# Patient Record
Sex: FEMALE | Race: WHITE | NOT HISPANIC OR LATINO | Employment: OTHER | URBAN - NONMETROPOLITAN AREA
[De-identification: names, ages, dates, MRNs, and addresses within clinical notes are randomized per-mention and may not be internally consistent; named-entity substitution may affect disease eponyms.]

---

## 2021-06-02 ENCOUNTER — OFFICE VISIT (OUTPATIENT)
Dept: FAMILY MEDICINE CLINIC | Facility: CLINIC | Age: 71
End: 2021-06-02

## 2021-06-02 VITALS
DIASTOLIC BLOOD PRESSURE: 84 MMHG | HEART RATE: 79 BPM | WEIGHT: 154 LBS | HEIGHT: 66 IN | BODY MASS INDEX: 24.75 KG/M2 | OXYGEN SATURATION: 96 % | TEMPERATURE: 98 F | SYSTOLIC BLOOD PRESSURE: 142 MMHG

## 2021-06-02 DIAGNOSIS — J44.1 CHRONIC OBSTRUCTIVE PULMONARY DISEASE WITH ACUTE EXACERBATION (HCC): Primary | ICD-10-CM

## 2021-06-02 DIAGNOSIS — Z85.118 HISTORY OF LUNG CANCER: ICD-10-CM

## 2021-06-02 DIAGNOSIS — Z12.11 COLON CANCER SCREENING: ICD-10-CM

## 2021-06-02 DIAGNOSIS — Z12.31 ENCOUNTER FOR SCREENING MAMMOGRAM FOR MALIGNANT NEOPLASM OF BREAST: ICD-10-CM

## 2021-06-02 DIAGNOSIS — Z78.0 POSTMENOPAUSE: ICD-10-CM

## 2021-06-02 DIAGNOSIS — I25.10 CORONARY ARTERIOSCLEROSIS: Chronic | ICD-10-CM

## 2021-06-02 DIAGNOSIS — E78.5 HYPERLIPIDEMIA, UNSPECIFIED HYPERLIPIDEMIA TYPE: Chronic | ICD-10-CM

## 2021-06-02 DIAGNOSIS — Z12.31 VISIT FOR SCREENING MAMMOGRAM: ICD-10-CM

## 2021-06-02 DIAGNOSIS — I25.2 HISTORY OF MI (MYOCARDIAL INFARCTION): ICD-10-CM

## 2021-06-02 PROBLEM — I21.3 STEMI (ST ELEVATION MYOCARDIAL INFARCTION): Status: ACTIVE | Noted: 2020-05-22

## 2021-06-02 PROBLEM — I21.19 ACUTE MYOCARDIAL INFARCTION OF INFERIOR WALL (HCC): Status: ACTIVE | Noted: 2018-11-05

## 2021-06-02 PROBLEM — Z87.891 EX-SMOKER: Status: ACTIVE | Noted: 2018-11-05

## 2021-06-02 PROBLEM — R07.9 CHEST PAIN: Status: ACTIVE | Noted: 2018-11-06

## 2021-06-02 PROBLEM — R06.00 DYSPNEA: Status: ACTIVE | Noted: 2018-11-05

## 2021-06-02 PROBLEM — G89.29 CHRONIC MIDLINE LOW BACK PAIN WITHOUT SCIATICA: Status: ACTIVE | Noted: 2019-07-22

## 2021-06-02 PROBLEM — M54.50 CHRONIC MIDLINE LOW BACK PAIN WITHOUT SCIATICA: Status: ACTIVE | Noted: 2019-07-22

## 2021-06-02 PROBLEM — Z95.5 STENTED CORONARY ARTERY: Status: ACTIVE | Noted: 2018-11-06

## 2021-06-02 PROCEDURE — 99204 OFFICE O/P NEW MOD 45 MIN: CPT | Performed by: NURSE PRACTITIONER

## 2021-06-02 PROCEDURE — 96372 THER/PROPH/DIAG INJ SC/IM: CPT | Performed by: NURSE PRACTITIONER

## 2021-06-02 RX ORDER — CARVEDILOL 6.25 MG/1
6.25 TABLET ORAL 2 TIMES DAILY WITH MEALS
COMMUNITY
Start: 2021-04-19

## 2021-06-02 RX ORDER — METHYLPREDNISOLONE ACETATE 80 MG/ML
60 INJECTION, SUSPENSION INTRA-ARTICULAR; INTRALESIONAL; INTRAMUSCULAR; SOFT TISSUE ONCE
Status: COMPLETED | OUTPATIENT
Start: 2021-06-02 | End: 2021-06-02

## 2021-06-02 RX ORDER — OXYCODONE AND ACETAMINOPHEN 7.5; 325 MG/1; MG/1
1 TABLET ORAL 3 TIMES DAILY PRN
COMMUNITY
Start: 2021-05-21

## 2021-06-02 RX ORDER — ATORVASTATIN CALCIUM 80 MG/1
80 TABLET, FILM COATED ORAL NIGHTLY
COMMUNITY
Start: 2021-04-19

## 2021-06-02 RX ORDER — NITROGLYCERIN 0.4 MG/1
TABLET SUBLINGUAL
COMMUNITY
End: 2022-05-23 | Stop reason: SDUPTHER

## 2021-06-02 RX ORDER — BRIMONIDINE TARTRATE 0.1 %
1 DROPS OPHTHALMIC (EYE) 2 TIMES DAILY
COMMUNITY
Start: 2021-03-30

## 2021-06-02 RX ORDER — ALBUTEROL SULFATE 90 UG/1
2 AEROSOL, METERED RESPIRATORY (INHALATION) EVERY 6 HOURS PRN
COMMUNITY
Start: 2021-04-20

## 2021-06-02 RX ORDER — ASPIRIN 81 MG/1
TABLET ORAL
COMMUNITY

## 2021-06-02 RX ORDER — TIOTROPIUM BROMIDE 18 UG/1
CAPSULE ORAL; RESPIRATORY (INHALATION)
COMMUNITY
Start: 2021-05-12

## 2021-06-02 RX ORDER — FUROSEMIDE 20 MG/1
20 TABLET ORAL DAILY
COMMUNITY
Start: 2021-04-19

## 2021-06-02 RX ORDER — METHYLPREDNISOLONE 4 MG/1
TABLET ORAL
Qty: 1 EACH | Refills: 0 | Status: SHIPPED | OUTPATIENT
Start: 2021-06-02 | End: 2021-07-07 | Stop reason: SDUPTHER

## 2021-06-02 RX ADMIN — METHYLPREDNISOLONE ACETATE 60 MG: 80 INJECTION, SUSPENSION INTRA-ARTICULAR; INTRALESIONAL; INTRAMUSCULAR; SOFT TISSUE at 16:03

## 2021-06-02 NOTE — PROGRESS NOTES
"Chief Complaint  Establish Care    Subjective          The patient presents today re-establish care after moving back to the area. She has a history of hyperlipidemia, coronary arteriosclerosis, myocardial infarction, COPD, lung nodules, chronic mid-low back pain, and a history of lung cancer. She also has a history of 2 stent implants in 2018. She has an appointment with her cardiologist 06/2021, after not seeing them for a year. She has not smoke cigarettes in 18 years. Would like however to establish care with local cardiologist since she has moved back to the [facility].     The patient reports that her chief complaint is difficulty breathing and dysphonia. She has been self treating at home with albuterol breathing treatments for temporary relief, but has run out of albuterol medication and refills. Her symptoms are aggravated by movement. She reports that since starting the breathing treatments she has begun coughing up a yellow substance. In addition, the patient complains of a rash across the bridge of her nose and bilateral cheeks. She denies pruritus and feels it is getting better, but is concerned it could be evidence of an allergic reaction and connected to her breathing difficulty. She denies wearing a CPAP machine at night. She also has avoided wearing a mask during the day for Covid-19 by staying home.     The patient admits to having taken 1 nitroglycerin pill near 03/2021 due to left arm pain which resolved afterward. She denies any nausea at the time. She would like a referral to a new cardiologist due to moving and wanting to go to one closer to her new location.      Angelica Call presents to [facility]  History of Present Illness    Objective   Vital Signs:   /84 (BP Location: Left arm, Patient Position: Sitting, Cuff Size: Adult)   Pulse 79   Temp 98 °F (36.7 °C) (Tympanic)   Ht 167.6 cm (66\")   Wt 69.9 kg (154 lb)   SpO2 96%   BMI 24.86 kg/m²   "   Physical Exam  Vitals and nursing note reviewed.   Constitutional:       General: She is not in acute distress.     Appearance: Normal appearance. She is not ill-appearing, toxic-appearing or diaphoretic.   HENT:      Head: Normocephalic and atraumatic.      Ears:      Comments: TMs not injected.     Mouth/Throat:      Pharynx: Posterior oropharyngeal erythema present.      Comments: Posterior pharynx mildly erythematous.  Cardiovascular:      Rate and Rhythm: Normal rate and regular rhythm.      Pulses: Normal pulses.      Heart sounds: Normal heart sounds. No murmur heard.   No friction rub. No gallop.    Pulmonary:      Effort: Respiratory distress present.      Breath sounds: No stridor. Wheezing present. No rhonchi or rales.      Comments: Lung fields are diminished throughout with scattered wheezes. Pulse ox on RA 96%  Musculoskeletal:      Right lower leg: No edema.      Left lower leg: No edema.   Skin:     General: Skin is warm and dry.      Findings: Erythema and rash present.      Comments: Across the bridge of her nose and cheeks, more so on L than R, she has a non itching, erythematous rash in individual lesions noted    Neurological:      Mental Status: She is alert and oriented to person, place, and time.   Psychiatric:         Mood and Affect: Mood normal.         Behavior: Behavior normal.         Thought Content: Thought content normal.         Judgment: Judgment normal.        Result Review :                   Assessment and Plan    Diagnoses and all orders for this visit:    1. Chronic obstructive pulmonary disease with acute exacerbation (CMS/HCC) (Primary)  -     Ambulatory Referral to Cardiology  -     Home Nebulizer  -     methylPREDNISolone acetate (DEPO-medrol) injection 60 mg  -     methylPREDNISolone (MEDROL) 4 MG dose pack; Take as directed on package instructions.  Dispense: 1 each; Refill: 0    2. Encounter for screening mammogram for malignant neoplasm of breast  -     Mammo  Screening Digital Tomosynthesis Bilateral With CAD; Future    3. Postmenopause  -     Ambulatory Referral to Cardiology  -     DEXA Bone Density Axial; Future    4. Coronary arteriosclerosis  -     Ambulatory Referral to Cardiology    5. Hyperlipidemia, unspecified hyperlipidemia type  -     Ambulatory Referral to Cardiology  -     Lipid panel; Future  -     CBC & Differential; Future  -     Comprehensive metabolic panel; Future  -     TSH; Future    6. History of lung cancer  -     Ambulatory Referral to Cardiology  -     Home Nebulizer    7. History of MI (myocardial infarction)  -     Ambulatory Referral to Cardiology    8. Visit for screening mammogram  -     Mammo Screening Digital Tomosynthesis Bilateral With CAD; Future    9. Colon cancer screening  -     Cologuard - Stool, Per Rectum; Future      I will obtain labs when she is fasting and she will be informed of the results via telephone. She will also be referred to a closer cardiologist at this locations so that she can follow up with him regarding her history of myocardial infarction and stemming coronary arteriosclerosis. She has already stopped smoking cigarettes. She was given a Depo 60 mg intramuscular injection in the office today, tolerated well. We will follow that with oral Medrol starting 06/03/2021. She is also given a new nebulizer machine and albuterol for her to use in the nebulizer once a day as needed. If symptoms should persist or worsen she will call back to let me know. Otherwise, she will as scheduled for chronic conditions. Mammogram and bone density tests have also been ordered for her. Health maintenance has been reviewed and updated. She has already received both of her Covid-19 injections. All questions and concerns are addressed with understanding noted. The patient is in agreement to above plan. Continue follow up with dr parry as scheduled.     I spent 33 minutes caring for Angelica on this date of service. This time includes  time spent by me in the following activities:preparing for the visit, performing a medically appropriate examination and/or evaluation , counseling and educating the patient/family/caregiver, ordering medications, tests, or procedures, referring and communicating with other health care professionals  and documenting information in the medical record  Follow Up   Return in about 6 months (around 12/2/2021), or if symptoms worsen or fail to improve, for Recheck, or sooner as needed.  Patient was given instructions and counseling regarding her condition or for health maintenance advice. Please see specific information pulled into the AVS if appropriate.     I TOAN Mckeon have personally performed the services described in this document as scribed by the above individual, and it is both accurate and complete.     TOAN Mckeon  6/3/2021  13:12 CDT

## 2021-06-03 RX ORDER — ALBUTEROL SULFATE 2.5 MG/3ML
2.5 SOLUTION RESPIRATORY (INHALATION) EVERY 4 HOURS PRN
Qty: 1 EACH | Refills: 11 | Status: SHIPPED | OUTPATIENT
Start: 2021-06-03

## 2021-06-03 RX ORDER — ALBUTEROL SULFATE 2.5 MG/3ML
2.5 SOLUTION RESPIRATORY (INHALATION) EVERY 4 HOURS PRN
Refills: 12 | Status: CANCELLED | OUTPATIENT
Start: 2021-06-03

## 2021-06-23 DIAGNOSIS — R07.9 CHEST PAIN, UNSPECIFIED TYPE: Primary | ICD-10-CM

## 2021-07-06 ENCOUNTER — LAB (OUTPATIENT)
Dept: LAB | Facility: OTHER | Age: 71
End: 2021-07-06

## 2021-07-06 DIAGNOSIS — E78.5 HYPERLIPIDEMIA, UNSPECIFIED HYPERLIPIDEMIA TYPE: ICD-10-CM

## 2021-07-06 LAB
ALBUMIN SERPL-MCNC: 4 G/DL (ref 3.5–5)
ALBUMIN/GLOB SERPL: 1.5 G/DL (ref 1.1–1.8)
ALP SERPL-CCNC: 68 U/L (ref 38–126)
ALT SERPL W P-5'-P-CCNC: 34 U/L
ANION GAP SERPL CALCULATED.3IONS-SCNC: 3 MMOL/L (ref 5–15)
AST SERPL-CCNC: 29 U/L (ref 14–36)
BASOPHILS # BLD AUTO: 0.05 10*3/MM3 (ref 0–0.2)
BASOPHILS NFR BLD AUTO: 0.9 % (ref 0–1.5)
BILIRUB SERPL-MCNC: 0.8 MG/DL (ref 0.2–1.3)
BUN SERPL-MCNC: 11 MG/DL (ref 7–23)
BUN/CREAT SERPL: 16.7 (ref 7–25)
CALCIUM SPEC-SCNC: 9.4 MG/DL (ref 8.4–10.2)
CHLORIDE SERPL-SCNC: 105 MMOL/L (ref 101–112)
CHOLEST SERPL-MCNC: 133 MG/DL (ref 150–200)
CO2 SERPL-SCNC: 31 MMOL/L (ref 22–30)
CREAT SERPL-MCNC: 0.66 MG/DL (ref 0.52–1.04)
DEPRECATED RDW RBC AUTO: 46.4 FL (ref 37–54)
EOSINOPHIL # BLD AUTO: 0 10*3/MM3 (ref 0–0.4)
EOSINOPHIL NFR BLD AUTO: 0 % (ref 0.3–6.2)
ERYTHROCYTE [DISTWIDTH] IN BLOOD BY AUTOMATED COUNT: 13.3 % (ref 12.3–15.4)
GFR SERPL CREATININE-BSD FRML MDRD: 89 ML/MIN/1.73 (ref 39–90)
GLOBULIN UR ELPH-MCNC: 2.7 GM/DL (ref 2.3–3.5)
GLUCOSE SERPL-MCNC: 98 MG/DL (ref 70–99)
HCT VFR BLD AUTO: 39 % (ref 34–46.6)
HDLC SERPL-MCNC: 58 MG/DL (ref 40–59)
HGB BLD-MCNC: 13.1 G/DL (ref 12–15.9)
LDLC SERPL CALC-MCNC: 61 MG/DL
LDLC/HDLC SERPL: 1.04 {RATIO} (ref 0–3.22)
LYMPHOCYTES # BLD AUTO: 2.46 10*3/MM3 (ref 0.7–3.1)
LYMPHOCYTES NFR BLD AUTO: 43.5 % (ref 19.6–45.3)
MCH RBC QN AUTO: 32.9 PG (ref 26.6–33)
MCHC RBC AUTO-ENTMCNC: 33.6 G/DL (ref 31.5–35.7)
MCV RBC AUTO: 98 FL (ref 79–97)
MONOCYTES # BLD AUTO: 0.53 10*3/MM3 (ref 0.1–0.9)
MONOCYTES NFR BLD AUTO: 9.4 % (ref 5–12)
NEUTROPHILS NFR BLD AUTO: 2.61 10*3/MM3 (ref 1.7–7)
NEUTROPHILS NFR BLD AUTO: 46.2 % (ref 42.7–76)
PLATELET # BLD AUTO: 240 10*3/MM3 (ref 140–450)
PMV BLD AUTO: 8.4 FL (ref 6–12)
POTASSIUM SERPL-SCNC: 4 MMOL/L (ref 3.4–5)
PROT SERPL-MCNC: 6.7 G/DL (ref 6.3–8.6)
RBC # BLD AUTO: 3.98 10*6/MM3 (ref 3.77–5.28)
SODIUM SERPL-SCNC: 139 MMOL/L (ref 137–145)
TRIGL SERPL-MCNC: 72 MG/DL
TSH SERPL DL<=0.05 MIU/L-ACNC: 1.43 UIU/ML (ref 0.27–4.2)
VLDLC SERPL-MCNC: 14 MG/DL (ref 5–40)
WBC # BLD AUTO: 5.65 10*3/MM3 (ref 3.4–10.8)

## 2021-07-06 PROCEDURE — 80061 LIPID PANEL: CPT | Performed by: NURSE PRACTITIONER

## 2021-07-06 PROCEDURE — 80053 COMPREHEN METABOLIC PANEL: CPT | Performed by: NURSE PRACTITIONER

## 2021-07-06 PROCEDURE — 84443 ASSAY THYROID STIM HORMONE: CPT | Performed by: NURSE PRACTITIONER

## 2021-07-06 PROCEDURE — 85025 COMPLETE CBC W/AUTO DIFF WBC: CPT | Performed by: NURSE PRACTITIONER

## 2021-07-06 PROCEDURE — 36415 COLL VENOUS BLD VENIPUNCTURE: CPT | Performed by: NURSE PRACTITIONER

## 2021-07-07 DIAGNOSIS — J44.1 CHRONIC OBSTRUCTIVE PULMONARY DISEASE WITH ACUTE EXACERBATION (HCC): ICD-10-CM

## 2021-07-07 RX ORDER — METHYLPREDNISOLONE 4 MG/1
TABLET ORAL
Qty: 1 EACH | Refills: 0 | Status: ON HOLD | OUTPATIENT
Start: 2021-07-07 | End: 2021-07-19

## 2021-07-14 ENCOUNTER — HOSPITAL ENCOUNTER (OUTPATIENT)
Dept: CARDIOLOGY | Facility: HOSPITAL | Age: 71
Discharge: HOME | End: 2021-07-14

## 2021-07-14 ENCOUNTER — HOSPITAL ENCOUNTER (OUTPATIENT)
Dept: NUCLEAR MEDICINE | Facility: HOSPITAL | Age: 71
Discharge: HOME | End: 2021-07-14

## 2021-07-14 DIAGNOSIS — R07.9 CHEST PAIN, UNSPECIFIED TYPE: ICD-10-CM

## 2021-07-14 DIAGNOSIS — I25.10 ATHEROSCLEROSIS OF NATIVE CORONARY ARTERY OF NATIVE HEART, ANGINA PRESENCE UNSPECIFIED: ICD-10-CM

## 2021-07-14 DIAGNOSIS — Z98.61 HISTORY OF PTCA: ICD-10-CM

## 2021-07-14 PROCEDURE — 93017 CV STRESS TEST TRACING ONLY: CPT

## 2021-07-14 PROCEDURE — 0 TECHNETIUM SESTAMIBI: Performed by: INTERNAL MEDICINE

## 2021-07-14 PROCEDURE — 78452 HT MUSCLE IMAGE SPECT MULT: CPT

## 2021-07-14 PROCEDURE — 25010000002 REGADENOSON 0.4 MG/5ML SOLUTION: Performed by: INTERNAL MEDICINE

## 2021-07-14 PROCEDURE — A9500 TC99M SESTAMIBI: HCPCS | Performed by: INTERNAL MEDICINE

## 2021-07-14 RX ADMIN — TECHNETIUM TC 99M SESTAMIBI 1 DOSE: 1 INJECTION INTRAVENOUS at 09:38

## 2021-07-14 RX ADMIN — TECHNETIUM TC 99M SESTAMIBI 1 DOSE: 1 INJECTION INTRAVENOUS at 11:00

## 2021-07-14 RX ADMIN — REGADENOSON 0.4 MG: 0.08 INJECTION, SOLUTION INTRAVENOUS at 11:00

## 2021-07-14 NOTE — H&P
Cardiology History and Physical Note        Patient Name: Angelica Call  Age/Sex: 71 y.o. female  : 1950  MRN: [id number]    Date of Admission : 2021    Primary care Physician: Marylu Torre APRN    Reason for Admission: Chest pain history of atherosclerotic coronary artery disease Lexiscan Cardiolite stress test      Subjective:       Chief Complaint: Chest pain left arm pain    History of Present Illness:  Angelica Call is a 71 y.o. female     Height of 5 feet 6 inches weight 254 pounds with a body mask index of 24 with a past medical history significant for atherosclerotic coronary artery disease s/p aborted myocardial infarction in 2018 with PTCA and stenting of the circumflex artery with deployment of a 3 mm x 15 mm and 2.5 mm x 8 mm Xience Luma drug-eluting stent in the circumflex artery with repeat coronary angiogram done in 2019, history of arterial hypertension, hypertensive heart disease, hyperlipidemia, right lung lobectomy, chronic back pain, chronic obstructive lung disease, history of glaucoma, and previous history of tobacco abuse.    Patient has been followed by [facility] in [place].    Patient was evaluated by the primary care physician and was recommended cardiac evaluation.  Patient complains of having left arm discomfort.  Patient had taken nitroglycerin.  Patient complains of having symptoms of left arm discomfort prior to the myocardial infarction and prior to the stent placement.    Patient on questioning complains of having some atypical symptoms of chest pain.  Patient complains of having bilateral lower extremity edema.  Patient does eat a lot of salt.  Patient on further questioning denies any symptoms of palpitation.  Patient denies any symptoms of lightheaded dizziness near syncope.  Patient denies any nausea vomiting.  Patient denies any fever chill productive cough.  Patient was last evaluated by the cardiologist about 2 years ago.    Patient has  not had any recent cardiac evaluation.  Patient resting electrocardiogram done reveals sinus bradycardia at the rate of 59 bpm with nonspecific ST-T wave changes.  Patient after the stent placement had taken Plavix of 3 months duration.    Blood pressure today is 135/80 pulse of 75 respiration of 18 oxygen saturation of 100%.    Patient 10 point review of system except for what stated in the history of present illness and negative.      Concurrent  Medical History:  1.  Bilateral arm pain with atypical chest pain with sinus bradycardia.  2.  Atherosclerotic coronary artery disease.  3.  Aborted myocardial infarction in October 13, 2018 in [place].  4.  PTCA and stenting of the circumflex artery with deployment of four 3 mm x 15 mm and a 2.5 mm x 8 mm Xience Luma drug-eluting stent in the circumflex artery.  5.  Nonobstructive disease in the right coronary artery and left anterior descending artery.  6.  Arterial hypertension.  7.  Hypertensive heart disease with nonrheumatic mild mitral and nonrheumatic mild tricuspid regurgitation.  8.  Hyperlipidemia.  9.  Chronic obstructive lung disease.  10.  Chronic back pain.  11.  Allergic rhinitis.  12.  History of glaucoma.  13.  History of right lung lobectomy.      Past Surgical History:  1.  Coronary angiogram and PTCA and stenting.  2.  Right upper lung lobectomy done in 2005.      Family History: No history of premature atherosclerotic coronary artery disease      Social History: Quit smoking 18 years ago denies any alcohol intake.       Cardiac Risk factor:   1.  Postmenopausal.  2.  Arterial hypertension.  3.  Hyperlipidemia.  4.  Previous history of tobacco abuse    Allergies:  Allergies   Allergen Reactions   • Bee Pollen Anaphylaxis       Home Medication::  Prior to Admission medications    Medication Sig Start Date End Date Taking? Authorizing Provider   albuterol (PROVENTIL) (2.5 MG/3ML) 0.083% nebulizer solution Take 2.5 mg by nebulization Every 4 (Four)  Hours As Needed for Wheezing. 6/3/21   Marylu Torre APRN   albuterol sulfate  (90 Base) MCG/ACT inhaler Inhale 2 puffs Every 6 (Six) Hours As Needed. 4/20/21   Caty Barnard MD   Alphagan P 0.1 % solution ophthalmic solution Administer 1 drop to both eyes 2 (Two) Times a Day. 3/30/21   Caty Barnard MD   aspirin (Aspir-Low) 81 MG EC tablet Aspir-Low 81 mg tablet,delayed release    Caty Barnard MD   atorvastatin (LIPITOR) 80 MG tablet  4/19/21   Caty Barnard MD   carvedilol (COREG) 6.25 MG tablet  4/19/21   Caty Barnard MD   furosemide (LASIX) 20 MG tablet  4/19/21   Caty Barnard MD   methylPREDNISolone (MEDROL) 4 MG dose pack Take as directed on package instructions. 7/7/21   Marylu Torre APRN   nitroglycerin (NITROSTAT) 0.4 MG SL tablet nitroglycerin 0.4 mg sublingual tablet    Caty Barnard MD   oxyCODONE-acetaminophen (PERCOCET) 7.5-325 MG per tablet Take 1 tablet by mouth 3 (Three) Times a Day As Needed. for pain 5/21/21   Caty Barnard MD   Spiriva HandiHaler 18 MCG per inhalation capsule INHALE CONTENTS OF 1 CAPSULE ONCE DAILY USING HANDIHALER 5/12/21   Caty Barnard MD Wixela Inhub 250-50 MCG/DOSE DISKUS Inhale 1 puff 2 (Two) Times a Day. 5/11/21   Caty Barnard MD         Review of Systems   Constitutional: Positive for fatigue. Negative for chills, fever and unexpected weight change.   HENT: Negative for hearing loss and nosebleeds.    Eyes: Negative for visual disturbance.   Respiratory: Positive for chest tightness. Negative for cough, shortness of breath and wheezing.    Cardiovascular: Positive for leg swelling. Negative for chest pain and palpitations.   Gastrointestinal: Negative for abdominal pain, blood in stool, constipation, diarrhea, nausea and vomiting.   Endocrine: Negative for cold intolerance, heat intolerance, polydipsia, polyphagia and polyuria.   Genitourinary: Negative for hematuria.    Musculoskeletal: Positive for arthralgias and back pain. Negative for joint swelling, myalgias and neck pain.   Skin: Negative for color change, rash and wound.   Neurological: Negative for dizziness, seizures, syncope, light-headedness, numbness and headaches.   Hematological: Does not bruise/bleed easily.         Objective:     There were no vitals taken for this visit.  Blood pressure today is 135/80 pulse of 75 respiration of 18 oxygen saturation of 100%.      Constitutional:       Appearance: Well-developed.   Eyes:      General: No scleral icterus.     Conjunctiva/sclera: Conjunctivae normal.      Pupils: Pupils are equal, round, and reactive to light.   HENT:      Head: Normocephalic and atraumatic.      Left Ear: External ear normal.      Nose: Nose normal.   Neck:      Thyroid: No thyromegaly.      Vascular: No JVD.      Trachea: No tracheal deviation.      Lymphadenopathy: No cervical adenopathy.   Pulmonary:      Breath sounds: Normal breath sounds. No stridor.   Cardiovascular:      Normal rate. Regular rhythm.   Abdominal:      General: Bowel sounds are normal.   Musculoskeletal: Normal range of motion.      Cervical back: Normal range of motion and neck supple. Skin:     General: Skin is warm and dry.   Neurological:      Mental Status: Alert and oriented to person, place, and time.      Deep Tendon Reflexes: Reflexes are normal and symmetric.   Psychiatric:         Behavior: Behavior normal.         Thought Content: Thought content normal.         Judgment: Judgment normal.         Lab Review:           Invalid input(s): LABALBU, PROT                                    EKG:   ECG/EMG Results (last 24 hours)     ** No results found for the last 24 hours. **          Imaging:  Imaging Results (Last 24 Hours)     ** No results found for the last 24 hours. **          I personally viewed and interpreted the patient's EKG/Telemetry data.    Assessment:   1.  Atypical chest discomfort.  2.  Bilateral arm  pain.  3.  Atherosclerotic coronary artery disease.  4.  Previous PTCA and stenting of the circumflex artery.  5.  Arterial hypertension.  6.  Hypertensive heart disease.  7.  Hyperlipidemia.  8.  Chronic obstructive lung disease with a right lung lobectomy.          Plan:   1.  Bilateral arm pain with chest pain with history of atherosclerotic coronary artery disease.  Patient had aborted myocardial infarction with PTCA and stenting of the circumflex artery done in October 2018.  Patient last cardiac evaluation was more than 2 years ago.  Patient is currently not on Plavix.  Patient resting electrocardiogram done reveals sinus bradycardia.  Patient has been experiencing similar discomfort of chest tightness and bilateral arm pain.  Due to the patient's symptom complex with previous aborted myocardial infarction in 2018 patient at the present time has been recommended a two-dimensional echocardiogram to evaluate the left ventricular systolic function and to rule out regional segmental wall motion abnormality and a myocardial perfusion Lexiscan Cardiolite stress test.  Risk-benefit treatment option for the myocardial perfusion Lexiscan Cardiolite stress test were discussed with the patient and an informed consent was obtained.  Plan was to proceed with a myocardial perfusion Lexiscan Cardiolite stress test on 7/14/2021.    2.  Arterial hypertension.  Patient blood pressures currently well controlled.  Patient would be continued on the present dose of the carvedilol 6.25 mg twice a day.  Patient has been counseled to decrease her salt intake.    3.  Hypertensive heart disease.  Clinically at the present time patient is is euvolemic.  Patient would be continued on the present dose of the Lasix 20 mg once a day.  Patient would undergo transthoracic echocardiogram.  Patient would be started on angiotensin receptor blocker or an ACE inhibitor for afterload reduction if the patient does have left ventricular systolic  dysfunction or valvular insufficiency.  Patient has previous history of nonrheumatic mild mitral and nonrheumatic mild tricuspid regurgitation.  Patient is to undergo a transthoracic echocardiogram.    4.  Hyperlipidemia.  Patient has been counseled on low-fat low-cholesterol diet and to continue the present dose of the Lipitor 80 mg at bedtime.  Patient is to undergo lipid profile including liver function test evaluation.    5.  Chronic obstructive lung disease with right lung lobectomy patient has been followed by the pulmonary Dr. Branch in [place].    6.  History of glaucoma as noted.    7.  Chronic back pain is noted.  Patient is currently on oxycodone and has been followed by the primary care physician.    The above plan of management were discussed with the patient      Time: time spent in face-to-face evaluation of greater than 55 minutes and interacting and formulating examining and discussing the plan with the patient with 50% of greater time spent in face-to-face interaction.    Electronically signed by Sha Giraldo MD, 07/14/21, 6:36 AM CDT.    Dictated utilizing Dragon dictation.

## 2021-07-16 ENCOUNTER — PREP FOR SURGERY (OUTPATIENT)
Dept: OTHER | Facility: HOSPITAL | Age: 71
End: 2021-07-16

## 2021-07-16 DIAGNOSIS — R93.1 ABNORMAL NUCLEAR CARDIAC IMAGING TEST: ICD-10-CM

## 2021-07-16 DIAGNOSIS — R07.9 CHEST PAIN, UNSPECIFIED TYPE: Primary | ICD-10-CM

## 2021-07-16 RX ORDER — SODIUM CHLORIDE 0.9 % (FLUSH) 0.9 %
10 SYRINGE (ML) INJECTION AS NEEDED
Status: CANCELLED | OUTPATIENT
Start: 2021-07-19

## 2021-07-16 RX ORDER — SODIUM CHLORIDE 0.9 % (FLUSH) 0.9 %
3 SYRINGE (ML) INJECTION EVERY 12 HOURS SCHEDULED
Status: CANCELLED | OUTPATIENT
Start: 2021-07-19

## 2021-07-16 RX ORDER — SODIUM CHLORIDE 9 MG/ML
75 INJECTION, SOLUTION INTRAVENOUS CONTINUOUS
Status: CANCELLED | OUTPATIENT
Start: 2021-07-19

## 2021-07-19 ENCOUNTER — HOSPITAL ENCOUNTER (OUTPATIENT)
Facility: HOSPITAL | Age: 71
Setting detail: HOSPITAL OUTPATIENT SURGERY
Discharge: HOME | End: 2021-07-19
Attending: INTERNAL MEDICINE | Admitting: INTERNAL MEDICINE

## 2021-07-19 VITALS
HEART RATE: 63 BPM | BODY MASS INDEX: 24.62 KG/M2 | DIASTOLIC BLOOD PRESSURE: 65 MMHG | TEMPERATURE: 98.4 F | OXYGEN SATURATION: 99 % | RESPIRATION RATE: 16 BRPM | WEIGHT: 153.22 LBS | SYSTOLIC BLOOD PRESSURE: 132 MMHG | HEIGHT: 66 IN

## 2021-07-19 DIAGNOSIS — R93.1 ABNORMAL NUCLEAR CARDIAC IMAGING TEST: ICD-10-CM

## 2021-07-19 DIAGNOSIS — R07.9 CHEST PAIN, UNSPECIFIED TYPE: ICD-10-CM

## 2021-07-19 LAB
ANION GAP SERPL CALCULATED.3IONS-SCNC: 7 MMOL/L (ref 5–15)
BASOPHILS # BLD AUTO: 0.04 10*3/MM3 (ref 0–0.2)
BASOPHILS NFR BLD AUTO: 0.6 % (ref 0–1.5)
BUN SERPL-MCNC: 12 MG/DL (ref 8–23)
BUN/CREAT SERPL: 18.5 (ref 7–25)
CALCIUM SPEC-SCNC: 8.8 MG/DL (ref 8.6–10.5)
CHLORIDE SERPL-SCNC: 98 MMOL/L (ref 98–107)
CO2 SERPL-SCNC: 26 MMOL/L (ref 22–29)
CREAT SERPL-MCNC: 0.65 MG/DL (ref 0.57–1)
DEPRECATED RDW RBC AUTO: 46.9 FL (ref 37–54)
EOSINOPHIL # BLD AUTO: 0 10*3/MM3 (ref 0–0.4)
EOSINOPHIL NFR BLD AUTO: 0 % (ref 0.3–6.2)
ERYTHROCYTE [DISTWIDTH] IN BLOOD BY AUTOMATED COUNT: 13.2 % (ref 12.3–15.4)
GFR SERPL CREATININE-BSD FRML MDRD: 90 ML/MIN/1.73
GLUCOSE SERPL-MCNC: 108 MG/DL (ref 65–99)
HCT VFR BLD AUTO: 39.8 % (ref 34–46.6)
HGB BLD-MCNC: 13.4 G/DL (ref 12–15.9)
IMM GRANULOCYTES # BLD AUTO: 0.03 10*3/MM3 (ref 0–0.05)
IMM GRANULOCYTES NFR BLD AUTO: 0.4 % (ref 0–0.5)
INR PPP: 0.91 (ref 0.8–1.2)
LYMPHOCYTES # BLD AUTO: 2.67 10*3/MM3 (ref 0.7–3.1)
LYMPHOCYTES NFR BLD AUTO: 37.4 % (ref 19.6–45.3)
MCH RBC QN AUTO: 32.1 PG (ref 26.6–33)
MCHC RBC AUTO-ENTMCNC: 33.7 G/DL (ref 31.5–35.7)
MCV RBC AUTO: 95.4 FL (ref 79–97)
MONOCYTES # BLD AUTO: 0.62 10*3/MM3 (ref 0.1–0.9)
MONOCYTES NFR BLD AUTO: 8.7 % (ref 5–12)
NEUTROPHILS NFR BLD AUTO: 3.77 10*3/MM3 (ref 1.7–7)
NEUTROPHILS NFR BLD AUTO: 52.9 % (ref 42.7–76)
NRBC BLD AUTO-RTO: 0 /100 WBC (ref 0–0.2)
PLATELET # BLD AUTO: 227 10*3/MM3 (ref 140–450)
PMV BLD AUTO: 8.6 FL (ref 6–12)
POTASSIUM SERPL-SCNC: 3.8 MMOL/L (ref 3.5–5.2)
PROTHROMBIN TIME: 12.2 SECONDS (ref 11.1–15.3)
RBC # BLD AUTO: 4.17 10*6/MM3 (ref 3.77–5.28)
SODIUM SERPL-SCNC: 131 MMOL/L (ref 136–145)
WBC # BLD AUTO: 7.13 10*3/MM3 (ref 3.4–10.8)

## 2021-07-19 PROCEDURE — C1894 INTRO/SHEATH, NON-LASER: HCPCS | Performed by: INTERNAL MEDICINE

## 2021-07-19 PROCEDURE — C1760 CLOSURE DEV, VASC: HCPCS | Performed by: INTERNAL MEDICINE

## 2021-07-19 PROCEDURE — 25010000002 HEPARIN (PORCINE) PER 1000 UNITS: Performed by: INTERNAL MEDICINE

## 2021-07-19 PROCEDURE — C1769 GUIDE WIRE: HCPCS | Performed by: INTERNAL MEDICINE

## 2021-07-19 PROCEDURE — 85610 PROTHROMBIN TIME: CPT | Performed by: INTERNAL MEDICINE

## 2021-07-19 PROCEDURE — 0 IOPAMIDOL PER 1 ML: Performed by: INTERNAL MEDICINE

## 2021-07-19 PROCEDURE — 80048 BASIC METABOLIC PNL TOTAL CA: CPT | Performed by: INTERNAL MEDICINE

## 2021-07-19 PROCEDURE — 85025 COMPLETE CBC W/AUTO DIFF WBC: CPT | Performed by: INTERNAL MEDICINE

## 2021-07-19 PROCEDURE — 25010000002 FENTANYL CITRATE (PF) 50 MCG/ML SOLUTION: Performed by: INTERNAL MEDICINE

## 2021-07-19 PROCEDURE — 93458 L HRT ARTERY/VENTRICLE ANGIO: CPT | Performed by: INTERNAL MEDICINE

## 2021-07-19 PROCEDURE — 25010000002 MIDAZOLAM PER 1 MG: Performed by: INTERNAL MEDICINE

## 2021-07-19 RX ORDER — LIDOCAINE HYDROCHLORIDE 20 MG/ML
INJECTION, SOLUTION INFILTRATION; PERINEURAL AS NEEDED
Status: DISCONTINUED | OUTPATIENT
Start: 2021-07-19 | End: 2021-07-19 | Stop reason: HOSPADM

## 2021-07-19 RX ORDER — ACETAMINOPHEN 325 MG/1
650 TABLET ORAL EVERY 4 HOURS PRN
Status: DISCONTINUED | OUTPATIENT
Start: 2021-07-19 | End: 2021-07-19 | Stop reason: HOSPADM

## 2021-07-19 RX ORDER — SODIUM CHLORIDE 9 MG/ML
100 INJECTION, SOLUTION INTRAVENOUS CONTINUOUS
Status: DISCONTINUED | OUTPATIENT
Start: 2021-07-19 | End: 2021-07-19 | Stop reason: HOSPADM

## 2021-07-19 RX ORDER — CLOPIDOGREL BISULFATE 75 MG/1
TABLET ORAL AS NEEDED
Status: DISCONTINUED | OUTPATIENT
Start: 2021-07-19 | End: 2021-07-19 | Stop reason: HOSPADM

## 2021-07-19 RX ORDER — FENTANYL CITRATE 50 UG/ML
INJECTION, SOLUTION INTRAMUSCULAR; INTRAVENOUS AS NEEDED
Status: DISCONTINUED | OUTPATIENT
Start: 2021-07-19 | End: 2021-07-19 | Stop reason: HOSPADM

## 2021-07-19 RX ORDER — MIDAZOLAM HYDROCHLORIDE 1 MG/ML
INJECTION INTRAMUSCULAR; INTRAVENOUS AS NEEDED
Status: DISCONTINUED | OUTPATIENT
Start: 2021-07-19 | End: 2021-07-19 | Stop reason: HOSPADM

## 2021-07-19 RX ORDER — CLOPIDOGREL BISULFATE 75 MG/1
75 TABLET ORAL DAILY
Qty: 30 TABLET | Refills: 11 | Status: SHIPPED | OUTPATIENT
Start: 2021-07-19

## 2021-07-19 RX ORDER — SODIUM CHLORIDE 0.9 % (FLUSH) 0.9 %
3 SYRINGE (ML) INJECTION EVERY 12 HOURS SCHEDULED
Status: DISCONTINUED | OUTPATIENT
Start: 2021-07-19 | End: 2021-07-19 | Stop reason: HOSPADM

## 2021-07-19 RX ORDER — SODIUM CHLORIDE 9 MG/ML
75 INJECTION, SOLUTION INTRAVENOUS CONTINUOUS
Status: DISCONTINUED | OUTPATIENT
Start: 2021-07-19 | End: 2021-07-19 | Stop reason: HOSPADM

## 2021-07-19 RX ORDER — SODIUM CHLORIDE 0.9 % (FLUSH) 0.9 %
10 SYRINGE (ML) INJECTION AS NEEDED
Status: DISCONTINUED | OUTPATIENT
Start: 2021-07-19 | End: 2021-07-19 | Stop reason: HOSPADM

## 2021-07-19 RX ADMIN — SODIUM CHLORIDE 75 ML/HR: 9 INJECTION, SOLUTION INTRAVENOUS at 06:29

## 2021-07-19 NOTE — H&P
Cardiology History and Physical Note        Patient Name: Angelica Call  Age/Sex: 71 y.o. female  : 1950  MRN: [id number]    Date of Admission : 2021    Primary care Physician: Tushar Witt DO    Reason for Admission: Chest pain positive myocardial perfusion scan left heart catheterization.      Subjective:       Chief Complaint: Chest pain    History of Present Illness:  Angelica Call is a 71 y.o. female     Body mass index is 24.73 kg/m².  Height of 5 feet 6 inches weight 254 pounds with a body mask index of 24 with a past medical history significant for atherosclerotic coronary artery disease s/p aborted myocardial infarction in 2018 with PTCA and stenting of the circumflex artery with deployment of a 3 mm x 15 mm and 2.5 mm x 8 mm Xience Luma drug-eluting stent in the circumflex artery with repeat coronary angiogram done in 2019, history of arterial hypertension, hypertensive heart disease, hyperlipidemia, right lung lobectomy, chronic back pain, chronic obstructive lung disease, history of glaucoma, and previous history of tobacco abuse.     Patient has been followed by [facility] in [place].     Patient was evaluated by the primary care physician and was recommended cardiac evaluation.  Patient complains of having left arm discomfort.  Patient had taken nitroglycerin.  Patient complains of having symptoms of left arm discomfort prior to the myocardial infarction and prior to the stent placement.     Patient on questioning complains of having some atypical symptoms of chest pain.  Patient complains of having bilateral lower extremity edema.  Patient does eat a lot of salt.  Patient on further questioning denies any symptoms of palpitation.  Patient denies any symptoms of lightheaded dizziness near syncope.  Patient denies any nausea vomiting.  Patient denies any fever chill productive cough.         Patient resting electrocardiogram done reveals sinus bradycardia  at the rate of 59 bpm with nonspecific ST-T wave changes.  Patient after the stent placement had taken Plavix of 3 months duration.    Patient underwent a myocardial perfusion scan.  Patient myocardial perfusion scan was suggestive of reversible ischemia in the inferior wall distribution and a high risk study.  Due to the patient positive myocardial perfusion scan with reversible ischemia in the inferior wall distribution with previous PTCA and stenting of the circumflex artery patient was recommended coronary angiogram.  Risk-benefit treatment option for the coronary angiogram were discussed with the patient and an informed consent was obtained.  Patient Mallampati score #2 patient ASA classification 1 #2.  Risk for minimal sedation was also discussed with the patient.     Blood pressure today is 135/80 pulse of 75 respiration of 18 oxygen saturation of 100%.     Patient 10 point review of system except for what stated in the history of present illness and negative.        Concurrent  Medical History:  1.  Positive myocardial perfusion scan for reversible ischemia in the inferior wall distribution and a high risk study.  2.  Atherosclerotic coronary artery disease.  3.  Aborted myocardial infarction in October 13, 2018 in [place].  4.  PTCA and stenting of the circumflex artery with deployment of four 3 mm x 15 mm and a 2.5 mm x 8 mm Xience Luma drug-eluting stent in the circumflex artery.  5.  Nonobstructive disease in the right coronary artery and left anterior descending artery.  6.  Arterial hypertension.  7.  Hypertensive heart disease with nonrheumatic mild mitral and nonrheumatic mild tricuspid regurgitation.  8.  Hyperlipidemia.  9.  Chronic obstructive lung disease.  10.  Chronic back pain.  11.  Allergic rhinitis.  12.  History of glaucoma.  13.  History of right lung lobectomy.        Past Surgical History:  1.  Coronary angiogram and PTCA and stenting.  2.  Right upper lung lobectomy done in  2005.        Family History: No history of premature atherosclerotic coronary artery disease        Social History: Quit smoking 18 years ago denies any alcohol intake.        Cardiac Risk factor:   1.  Postmenopausal.  2.  Arterial hypertension.  3.  Hyperlipidemia.  4.  Previous history of tobacco abuse    Allergies:  Allergies   Allergen Reactions   • Bee Pollen Anaphylaxis       Home Medication::  Prior to Admission medications    Medication Sig Start Date End Date Taking? Authorizing Provider   albuterol (PROVENTIL) (2.5 MG/3ML) 0.083% nebulizer solution Take 2.5 mg by nebulization Every 4 (Four) Hours As Needed for Wheezing. 6/3/21  Yes Marylu Torre APRN   albuterol sulfate  (90 Base) MCG/ACT inhaler Inhale 2 puffs Every 6 (Six) Hours As Needed. 4/20/21  Yes Caty Barnard MD   Alphagan P 0.1 % solution ophthalmic solution Administer 1 drop to both eyes 2 (Two) Times a Day. 3/30/21  Yes Caty Barnard MD   aspirin (Aspir-Low) 81 MG EC tablet Aspir-Low 81 mg tablet,delayed release   Yes ProviderCaty MD   carvedilol (COREG) 6.25 MG tablet Take 6.25 mg by mouth 2 (Two) Times a Day With Meals. 4/19/21  Yes Caty Barnard MD   furosemide (LASIX) 20 MG tablet Take 20 mg by mouth Daily. 4/19/21  Yes Caty Barnard MD   oxyCODONE-acetaminophen (PERCOCET) 7.5-325 MG per tablet Take 1 tablet by mouth 3 (Three) Times a Day As Needed. for pain 5/21/21  Yes Caty Barnard MD   Spiriva HandiHaler 18 MCG per inhalation capsule INHALE CONTENTS OF 1 CAPSULE ONCE DAILY USING HANDIHALER 5/12/21  Yes Caty Barnard MD Wixela Inhub 250-50 MCG/DOSE DISKUS Inhale 1 puff 2 (Two) Times a Day. 5/11/21  Yes Caty Barnard MD   atorvastatin (LIPITOR) 80 MG tablet Take 80 mg by mouth Every Night. 4/19/21   Caty Barnard MD   nitroglycerin (NITROSTAT) 0.4 MG SL tablet nitroglycerin 0.4 mg sublingual tablet    Caty Barnard MD   methylPREDNISolone (MEDROL)  "4 MG dose pack Take as directed on package instructions. 7/7/21 7/19/21  Marylu Torre, APRN         Review of Systems   Constitutional: Negative for chills, fever and unexpected weight change.   HENT: Negative for hearing loss and nosebleeds.    Eyes: Negative for visual disturbance.   Respiratory: Positive for chest tightness and shortness of breath. Negative for cough and wheezing.    Cardiovascular: Positive for chest pain. Negative for palpitations and leg swelling.   Gastrointestinal: Negative for abdominal pain, blood in stool, constipation, diarrhea, nausea and vomiting.   Endocrine: Negative for cold intolerance, heat intolerance, polydipsia, polyphagia and polyuria.   Genitourinary: Negative for hematuria.   Musculoskeletal: Negative for joint swelling, myalgias and neck pain.   Skin: Negative for color change, rash and wound.   Neurological: Negative for dizziness, seizures, syncope, light-headedness, numbness and headaches.   Hematological: Does not bruise/bleed easily.         Objective:     BP (!) 182/82 (BP Location: Left arm, Patient Position: Lying)   Pulse 70   Temp 97.1 °F (36.2 °C) (Temporal)   Resp 18   Ht 167.6 cm (66\")   Wt 69.5 kg (153 lb 3.5 oz)   SpO2 95%   BMI 24.73 kg/m²     Constitutional:       Appearance: Well-developed.   Eyes:      General: No scleral icterus.     Conjunctiva/sclera: Conjunctivae normal.      Pupils: Pupils are equal, round, and reactive to light.   HENT:      Head: Normocephalic and atraumatic.      Left Ear: External ear normal.      Nose: Nose normal.   Neck:      Thyroid: No thyromegaly.      Vascular: No JVD.      Trachea: No tracheal deviation.      Lymphadenopathy: No cervical adenopathy.   Pulmonary:      Breath sounds: Normal breath sounds. No stridor.   Cardiovascular:      Normal rate. Regular rhythm.   Abdominal:      General: Bowel sounds are normal.   Musculoskeletal: Normal range of motion.      Cervical back: Normal range of motion and neck " supple. Skin:     General: Skin is warm and dry.   Neurological:      Mental Status: Alert and oriented to person, place, and time.      Deep Tendon Reflexes: Reflexes are normal and symmetric.   Psychiatric:         Behavior: Behavior normal.         Thought Content: Thought content normal.         Judgment: Judgment normal.         Lab Review:     Results from last 7 days   Lab Units 07/19/21  0617   SODIUM mmol/L 131*   POTASSIUM mmol/L 3.8   CHLORIDE mmol/L 98   CO2 mmol/L 26.0   BUN mg/dL 12   CREATININE mg/dL 0.65   CALCIUM mg/dL 8.8   GLUCOSE mg/dL 108*             Results from last 7 days   Lab Units 07/19/21  0617   WBC 10*3/mm3 7.13   HEMOGLOBIN g/dL 13.4   HEMATOCRIT % 39.8   PLATELETS 10*3/mm3 227     Results from last 7 days   Lab Units 07/19/21  0617   INR  0.91                       EKG:   ECG/EMG Results (last 24 hours)     ** No results found for the last 24 hours. **          Imaging:  Imaging Results (Last 24 Hours)     ** No results found for the last 24 hours. **          I personally viewed and interpreted the patient's EKG/Telemetry data.    Assessment:   1.  Chest pain.  2.  Atherosclerotic coronary artery disease.  3.  Previous PTCA and stenting.  4.  Arterial hypertension.  5.  Hypertensive heart disease.  6.  Hyperlipidemia.  7.  Chronic obstructive lung disease        Plan:   1.  Chest pain.  Patient does have history of documented atherosclerotic coronary artery disease with previous PTCA and stenting.  Patient has symptoms of substernal chest pain which was atypical.  Patient underwent a myocardial perfusion scan.  Patient myocardial perfusion scan was suggestive of reversible ischemia in the inferior wall distribution and a high risk study.  Patient was recommended coronary angiogram.  Risk-benefit treatment option for the coronary angiogram were discussed with the patient and an informed consent was obtained.  Patient Mallampati score #2 patient ASA classification 1 #2.  Risk for  minimal sedation was also discussed with the patient.  Plan was to proceed with a coronary angiogram.    2.  Arterial hypertension.  Patient blood pressure is currently well controlled.  Patient will be continued on the present dose of the carvedilol.    3.  Hypertensive heart disease with nonrheumatic mitral and tricuspid regurgitation.  Clinically at the present time patient is not in congestive heart failure.    4.  Hyperlipidemia.  Patient has been counseled on low-fat low-cholesterol diet and to continue the present dose of the Lipitor.        5.  Chronic obstructive lung disease with right lung lobectomy patient has been followed by the pulmonary Dr. Branch in [place].     6.  History of glaucoma as noted.     7.  Chronic back pain is noted.  Patient is currently on oxycodone and has been followed by the primary care physician.     The above plan of management were discussed with the patient        Time: time spent in face-to-face evaluation of greater than 55 minutes and interacting and formulating examining and discussing the plan with the patient with 50% of greater time spent in face-to-face interaction.    Electronically signed by Sha Giraldo MD, 07/19/21, 6:52 AM CDT.      Dictated utilizing Dragon dictation.

## 2021-07-23 ENCOUNTER — DOCUMENTATION (OUTPATIENT)
Dept: CARDIAC REHAB | Facility: HOSPITAL | Age: 71
End: 2021-07-23

## 2021-07-28 ENCOUNTER — DOCUMENTATION (OUTPATIENT)
Dept: CARDIAC REHAB | Facility: HOSPITAL | Age: 71
End: 2021-07-28

## 2021-08-03 LAB
BH CV REST NUCLEAR ISOTOPE DOSE: 9.9 MCI
BH CV STRESS COMMENTS STAGE 1: NORMAL
BH CV STRESS DOSE REGADENOSON STAGE 1: 0.4
BH CV STRESS DURATION MIN STAGE 1: 0
BH CV STRESS DURATION SEC STAGE 1: 10
BH CV STRESS NUCLEAR ISOTOPE DOSE: 33.5 MCI
BH CV STRESS PROTOCOL 1: NORMAL
BH CV STRESS RECOVERY BP: NORMAL MMHG
BH CV STRESS RECOVERY HR: 80 BPM
BH CV STRESS RECOVERY O2: 99 %
BH CV STRESS STAGE 1: 1
LV EF NUC BP: 59 %
MAXIMAL PREDICTED HEART RATE: 150 BPM
PERCENT MAX PREDICTED HR: 62 %
STRESS BASELINE BP: NORMAL MMHG
STRESS BASELINE HR: 57 BPM
STRESS O2 SAT REST: 96 %
STRESS PERCENT HR: 73 %
STRESS POST O2 SAT PEAK: 97 %
STRESS POST PEAK BP: NORMAL MMHG
STRESS POST PEAK HR: 93 BPM
STRESS TARGET HR: 128 BPM

## 2021-10-11 ENCOUNTER — OFFICE VISIT (OUTPATIENT)
Dept: FAMILY MEDICINE CLINIC | Facility: CLINIC | Age: 71
End: 2021-10-11

## 2021-10-11 VITALS
OXYGEN SATURATION: 95 % | BODY MASS INDEX: 24.91 KG/M2 | DIASTOLIC BLOOD PRESSURE: 78 MMHG | HEIGHT: 66 IN | TEMPERATURE: 96.6 F | HEART RATE: 66 BPM | SYSTOLIC BLOOD PRESSURE: 140 MMHG | WEIGHT: 155 LBS

## 2021-10-11 DIAGNOSIS — Z87.891 EX-SMOKER: ICD-10-CM

## 2021-10-11 DIAGNOSIS — R06.09 DYSPNEA ON EXERTION: ICD-10-CM

## 2021-10-11 DIAGNOSIS — J44.0 CHRONIC OBSTRUCTIVE PULMONARY DISEASE WITH ACUTE LOWER RESPIRATORY INFECTION (HCC): Primary | ICD-10-CM

## 2021-10-11 PROCEDURE — 99214 OFFICE O/P EST MOD 30 MIN: CPT | Performed by: NURSE PRACTITIONER

## 2021-10-11 PROCEDURE — 96372 THER/PROPH/DIAG INJ SC/IM: CPT | Performed by: NURSE PRACTITIONER

## 2021-10-11 RX ORDER — PREDNISONE 20 MG/1
20 TABLET ORAL 2 TIMES DAILY
Qty: 10 TABLET | Refills: 0 | Status: SHIPPED | OUTPATIENT
Start: 2021-10-11 | End: 2021-10-16

## 2021-10-11 RX ORDER — AZITHROMYCIN 250 MG/1
TABLET, FILM COATED ORAL
Qty: 6 TABLET | Refills: 0 | Status: SHIPPED | OUTPATIENT
Start: 2021-10-11 | End: 2021-11-19 | Stop reason: SDUPTHER

## 2021-10-11 RX ORDER — METHYLPREDNISOLONE ACETATE 80 MG/ML
60 INJECTION, SUSPENSION INTRA-ARTICULAR; INTRALESIONAL; INTRAMUSCULAR; SOFT TISSUE ONCE
Status: COMPLETED | OUTPATIENT
Start: 2021-10-11 | End: 2021-10-11

## 2021-10-11 RX ADMIN — METHYLPREDNISOLONE ACETATE 60 MG: 80 INJECTION, SUSPENSION INTRA-ARTICULAR; INTRALESIONAL; INTRAMUSCULAR; SOFT TISSUE at 15:00

## 2021-10-11 NOTE — PROGRESS NOTES
"Chief Complaint  Shortness of Breath    Subjective          Angelica Marlin Call presents to [facility] [place]  History of Present Illness     Ms. Call presents to the clinic today due to cough, and chest congestion. She states that she has been experiencing dyspnea on exertion, as well as shortness of breath at rest. She reports that she has had this symptom for a very long time, but recently the dyspnea on exertion is happening more often. She sees her cardiologist on 10/27/2021. She reports she had a productive cough after using her nebulizer, but is not having a cough currently. She notes that her rescue inhalers do not work well for her. She uses Spiriva. She is also using Advair, and albuterol as a rescue inhaler. She had steroids 1 month ago which did help she states. She adds that she was seeing Dr. Colbert for pulmonary, but she does not see him anymore, and is requesting a referral to a new pulmonologist. She denies being exposed to COVID-19.    She is not smoking.     Objective   Vital Signs:   /78   Pulse 66   Temp 96.6 °F (35.9 °C)   Ht 167.6 cm (66\")   Wt 70.3 kg (155 lb)   SpO2 95%   BMI 25.02 kg/m²     Physical Exam  Vitals and nursing note reviewed.   Constitutional:       General: She is not in acute distress.     Appearance: Normal appearance. She is not ill-appearing, toxic-appearing or diaphoretic.   HENT:      Head: Normocephalic and atraumatic.   Cardiovascular:      Rate and Rhythm: Normal rate and regular rhythm.      Pulses: Normal pulses.      Heart sounds: Normal heart sounds. No murmur heard.  No friction rub. No gallop.    Pulmonary:      Effort: Pulmonary effort is normal. No respiratory distress.      Breath sounds: No stridor. Examination of the right-upper field reveals decreased breath sounds and wheezing. Examination of the left-upper field reveals decreased breath sounds and wheezing. Examination of the right-middle field " reveals decreased breath sounds and wheezing. Examination of the left-middle field reveals decreased breath sounds and wheezing. Examination of the right-lower field reveals decreased breath sounds and wheezing. Examination of the left-lower field reveals decreased breath sounds and wheezing. Decreased breath sounds, wheezing and rhonchi present.      Comments: Lung fields are with wheezes throughout. Inspiratory, and expiratory diminished lung fields auscultated anteriorly, and posteriorly. Pulse oximetry on room air 95 percent.    Skin:     General: Skin is warm and dry.   Neurological:      Mental Status: She is alert and oriented to person, place, and time.   Psychiatric:         Mood and Affect: Mood normal.         Behavior: Behavior normal.         Thought Content: Thought content normal.         Judgment: Judgment normal.        Result Review :       Data reviewed: Radiologic studies cxr results reviewed from today           Assessment and Plan    Diagnoses and all orders for this visit:    1. Chronic obstructive pulmonary disease with acute lower respiratory infection (HCC) (Primary)  -     methylPREDNISolone acetate (DEPO-medrol) injection 60 mg  -     XR Chest 2 View  -     Ambulatory Referral to Pulmonology    2. Ex-smoker  -     methylPREDNISolone acetate (DEPO-medrol) injection 60 mg  -     XR Chest 2 View  -     Ambulatory Referral to Pulmonology    3. Dyspnea on exertion  -     methylPREDNISolone acetate (DEPO-medrol) injection 60 mg  -     XR Chest 2 View  -     Ambulatory Referral to Pulmonology    Other orders  -     azithromycin (Zithromax Z-Juliano) 250 MG tablet; Take 2 po now and 1 po d2-5  Dispense: 6 tablet; Refill: 0  -     predniSONE (DELTASONE) 20 MG tablet; Take 1 tablet by mouth 2 (Two) Times a Day for 5 days.  Dispense: 10 tablet; Refill: 0    We will obtain a chest x-ray, will inform of results via phone. We will give her a Depo-Medrol 60 mg injection intramuscularly in the office today,  and tolerated well. She will follow up with prednisone orally in the next couple of days if needed, if still having cough , difficulty breathing, chest congestion/wheezing. If her symptoms persist or worsen, she is referred on to pulmonology, and will continue with Advair, Spiriva, and albuterol as needed. She is also prescribed Zithromax. If her symptoms should persist or worsen, she is asked to call back here or come back for recheck. All questions and concerns are addressed with understanding noted. The patient is in agreement to above plan. Will also refer her back to pulmonology for follow up as well. Pt aware.     I spent 33 minutes caring for Angelica on this date of service. This time includes time spent by me in the following activities:preparing for the visit, reviewing tests, performing a medically appropriate examination and/or evaluation , counseling and educating the patient/family/caregiver, ordering medications, tests, or procedures, referring and communicating with other health care professionals  and documenting information in the medical record  Follow Up   Return if symptoms worsen or fail to improve, for Recheck, or sooner as needed.  Patient was given instructions and counseling regarding her condition or for health maintenance advice. Please see specific information pulled into the AVS if appropriate.     Transcribed from ambient dictation for TOAN Mckeon by Jacque Alston.  10/11/21   15:08 CDT    I have personally performed the services described in this document as transcribed by the above individual, and it is both accurate and complete.  TOAN Mckeon  10/11/2021  16:54 CDT

## 2021-11-19 ENCOUNTER — DOCUMENTATION (OUTPATIENT)
Dept: FAMILY MEDICINE CLINIC | Facility: CLINIC | Age: 71
End: 2021-11-19

## 2021-11-19 RX ORDER — AZITHROMYCIN 250 MG/1
TABLET, FILM COATED ORAL
Qty: 6 TABLET | Refills: 0 | Status: SHIPPED | OUTPATIENT
Start: 2021-11-19 | End: 2021-12-28

## 2021-11-19 RX ORDER — PREDNISONE 20 MG/1
20 TABLET ORAL 2 TIMES DAILY
Qty: 10 TABLET | Refills: 0 | Status: SHIPPED | OUTPATIENT
Start: 2021-11-19 | End: 2021-11-24

## 2021-12-28 ENCOUNTER — LAB (OUTPATIENT)
Dept: LAB | Facility: OTHER | Age: 71
End: 2021-12-28

## 2021-12-28 ENCOUNTER — OFFICE VISIT (OUTPATIENT)
Dept: FAMILY MEDICINE CLINIC | Facility: CLINIC | Age: 71
End: 2021-12-28

## 2021-12-28 VITALS
SYSTOLIC BLOOD PRESSURE: 130 MMHG | HEIGHT: 66 IN | WEIGHT: 151 LBS | DIASTOLIC BLOOD PRESSURE: 80 MMHG | HEART RATE: 69 BPM | BODY MASS INDEX: 24.27 KG/M2 | OXYGEN SATURATION: 95 %

## 2021-12-28 DIAGNOSIS — R06.02 SHORTNESS OF BREATH: ICD-10-CM

## 2021-12-28 DIAGNOSIS — J44.1 COPD WITH EXACERBATION: ICD-10-CM

## 2021-12-28 DIAGNOSIS — M79.89 LEG SWELLING: ICD-10-CM

## 2021-12-28 DIAGNOSIS — J44.1 COPD WITH EXACERBATION (HCC): Primary | ICD-10-CM

## 2021-12-28 LAB
ALBUMIN SERPL-MCNC: 3.8 G/DL (ref 3.5–5)
ALBUMIN/GLOB SERPL: 1.4 G/DL (ref 1.1–1.8)
ALP SERPL-CCNC: 93 U/L (ref 38–126)
ALT SERPL W P-5'-P-CCNC: 64 U/L
ANION GAP SERPL CALCULATED.3IONS-SCNC: 4 MMOL/L (ref 5–15)
AST SERPL-CCNC: 54 U/L (ref 14–36)
BASOPHILS # BLD AUTO: 0.02 10*3/MM3 (ref 0–0.2)
BASOPHILS NFR BLD AUTO: 0.3 % (ref 0–1.5)
BILIRUB SERPL-MCNC: 1.1 MG/DL (ref 0.2–1.3)
BNP SERPL-MCNC: 30.8 PG/ML (ref 0–100)
BUN SERPL-MCNC: 17 MG/DL (ref 7–23)
BUN/CREAT SERPL: 27.9 (ref 7–25)
CALCIUM SPEC-SCNC: 9.2 MG/DL (ref 8.4–10.2)
CHLORIDE SERPL-SCNC: 104 MMOL/L (ref 101–112)
CO2 SERPL-SCNC: 31 MMOL/L (ref 22–30)
CREAT SERPL-MCNC: 0.61 MG/DL (ref 0.52–1.04)
DEPRECATED RDW RBC AUTO: 47.2 FL (ref 37–54)
EOSINOPHIL # BLD AUTO: 0 10*3/MM3 (ref 0–0.4)
EOSINOPHIL NFR BLD AUTO: 0 % (ref 0.3–6.2)
ERYTHROCYTE [DISTWIDTH] IN BLOOD BY AUTOMATED COUNT: 13.1 % (ref 12.3–15.4)
GFR SERPL CREATININE-BSD FRML MDRD: 97 ML/MIN/1.73 (ref 39–90)
GLOBULIN UR ELPH-MCNC: 2.7 GM/DL (ref 2.3–3.5)
GLUCOSE SERPL-MCNC: 103 MG/DL (ref 70–99)
HCT VFR BLD AUTO: 38.4 % (ref 34–46.6)
HGB BLD-MCNC: 13 G/DL (ref 12–15.9)
LYMPHOCYTES # BLD AUTO: 2.6 10*3/MM3 (ref 0.7–3.1)
LYMPHOCYTES NFR BLD AUTO: 37 % (ref 19.6–45.3)
MCH RBC QN AUTO: 33.9 PG (ref 26.6–33)
MCHC RBC AUTO-ENTMCNC: 33.9 G/DL (ref 31.5–35.7)
MCV RBC AUTO: 100 FL (ref 79–97)
MONOCYTES # BLD AUTO: 0.57 10*3/MM3 (ref 0.1–0.9)
MONOCYTES NFR BLD AUTO: 8.1 % (ref 5–12)
NEUTROPHILS NFR BLD AUTO: 3.83 10*3/MM3 (ref 1.7–7)
NEUTROPHILS NFR BLD AUTO: 54.6 % (ref 42.7–76)
PLATELET # BLD AUTO: 240 10*3/MM3 (ref 140–450)
PMV BLD AUTO: 8.9 FL (ref 6–12)
POTASSIUM SERPL-SCNC: 3.8 MMOL/L (ref 3.4–5)
PROT SERPL-MCNC: 6.5 G/DL (ref 6.3–8.6)
RBC # BLD AUTO: 3.84 10*6/MM3 (ref 3.77–5.28)
SODIUM SERPL-SCNC: 139 MMOL/L (ref 137–145)
WBC NRBC COR # BLD: 7.02 10*3/MM3 (ref 3.4–10.8)

## 2021-12-28 PROCEDURE — 36415 COLL VENOUS BLD VENIPUNCTURE: CPT | Performed by: NURSE PRACTITIONER

## 2021-12-28 PROCEDURE — 96372 THER/PROPH/DIAG INJ SC/IM: CPT | Performed by: NURSE PRACTITIONER

## 2021-12-28 PROCEDURE — 80053 COMPREHEN METABOLIC PANEL: CPT | Performed by: NURSE PRACTITIONER

## 2021-12-28 PROCEDURE — 85025 COMPLETE CBC W/AUTO DIFF WBC: CPT | Performed by: NURSE PRACTITIONER

## 2021-12-28 PROCEDURE — 99214 OFFICE O/P EST MOD 30 MIN: CPT | Performed by: NURSE PRACTITIONER

## 2021-12-28 PROCEDURE — 83880 ASSAY OF NATRIURETIC PEPTIDE: CPT | Performed by: NURSE PRACTITIONER

## 2021-12-28 RX ORDER — AZITHROMYCIN 250 MG/1
250 TABLET, FILM COATED ORAL DAILY
COMMUNITY
End: 2022-05-09

## 2021-12-28 RX ORDER — METHYLPREDNISOLONE ACETATE 80 MG/ML
60 INJECTION, SUSPENSION INTRA-ARTICULAR; INTRALESIONAL; INTRAMUSCULAR; SOFT TISSUE ONCE
Status: COMPLETED | OUTPATIENT
Start: 2021-12-28 | End: 2021-12-28

## 2021-12-28 RX ORDER — PREDNISONE 20 MG/1
20 TABLET ORAL 2 TIMES DAILY
Qty: 10 TABLET | Refills: 0 | Status: SHIPPED | OUTPATIENT
Start: 2021-12-28 | End: 2022-01-02

## 2021-12-28 RX ADMIN — METHYLPREDNISOLONE ACETATE 60 MG: 80 INJECTION, SUSPENSION INTRA-ARTICULAR; INTRALESIONAL; INTRAMUSCULAR; SOFT TISSUE at 10:54

## 2021-12-29 NOTE — PROGRESS NOTES
"Chief Complaint  Foot Swelling (Both feet swelling and up in legs.) and Shortness of Breath    Subjective        The patient presents today with complaints of edema of the feet and legs.    The patient recalls experiencing swelling of the feet to above her knees on 12/27/2021. She states that her symptoms have improved today. She has been experiencing breathing issues as well.  She adds that she had to use her albuterol inhaler prior to this visit due to walking a longer distance to our office. The patient states that she does not experience breathing issues if she is sitting down/avoiding ambulation. She saw Dr. Arguello for 17 years for her COPD, but is now seeing Dr. Nino, who advised that she follow up in 3 months. She inquires about ambulatory oxygen today. The patient states that she is no longer smoking cigarettes. She reports that the steroids provided temporary relief, but approximately 1 week later her symptoms return. The patient states that she feels \"heavy\" with ambulation, like she is unable to breathe. She reports a previous chest x-ray that was unremarkabke for pneumonia at that time. The patient denies a history of congestive heart failure.    The patient takes furosemide 20 mg daily.      Angelica Call presents to [facility]     History of Present Illness    Objective   Vital Signs:   /80   Pulse 69   Ht 167.6 cm (66\")   Wt 68.5 kg (151 lb)   SpO2 95%   BMI 24.37 kg/m²       Physical Exam  Vitals and nursing note reviewed.   Constitutional:       General: She is not in acute distress.     Appearance: Normal appearance. She is well-developed. She is not ill-appearing, toxic-appearing or diaphoretic.   Cardiovascular:      Rate and Rhythm: Normal rate and regular rhythm.      Heart sounds: Normal heart sounds. No murmur heard.  No friction rub. No gallop.       Comments: Pulse oximetry at rest is 95 percent. Ambulatory pulse " oximetry obtained.  Pulmonary:      Effort: Pulmonary effort is normal. No respiratory distress.      Breath sounds: Normal air entry. No stridor. Decreased breath sounds and wheezing present. No rhonchi or rales.      Comments: Lung fields are diminished throughout with some scattered wheezes. Pulse ox on RA 95%  Ambulatory pulse ox drops to 89% and with rest and o2 applied per NC at 2L in office increases back up to 95-98%  Musculoskeletal:      Right lower leg: Edema present.      Left lower leg: Edema present.      Comments: Edema from the knees down to the feet; with 1+ pitting edema right below the knees proximally and at the feet, 2+ pitting edema.   Skin:     General: Skin is warm and dry.      Coloration: Skin is not jaundiced or pale.      Findings: No bruising, erythema, lesion or rash.   Neurological:      Mental Status: She is alert and oriented to person, place, and time.      Motor: Motor function is intact.   Psychiatric:         Mood and Affect: Mood normal.         Behavior: Behavior normal.         Thought Content: Thought content normal.         Judgment: Judgment normal.          Result Review :     Common labs    Common Labsle 7/6/21 7/6/21 7/6/21 7/19/21 7/19/21 12/28/21 12/28/21    0846 [id number] 0846 [id number] 0617 1054 1054   Glucose   98  108 (A)  103 (A)   BUN   11  12  17   Creatinine   0.66  0.65  0.61   eGFR Non  Am   89  90  97 (A)   Sodium   139  131 (A)  139   Potassium   4.0  3.8  3.8   Chloride   105  98  104   Calcium   9.4  8.8  9.2   Albumin   4.00    3.80   Total Bilirubin   0.8    1.1   Alkaline Phosphatase   68    93   AST (SGOT)   29    54 (A)   ALT (SGPT)   34    64 (A)   WBC 5.65   7.13  7.02    Hemoglobin 13.1   13.4  13.0    Hematocrit 39.0   39.8  38.4    Platelets 240   227  240    Total Cholesterol  133 (A)        Triglycerides  72        HDL Cholesterol  58        LDL Cholesterol   61        (A) Abnormal value       Comments are available for some flowsheets but are  not being displayed.           CMP    CMP 7/6/21 7/19/21 12/28/21   Glucose 98 108 (A) 103 (A)   BUN 11 12 17   Creatinine 0.66 0.65 0.61   eGFR Non  Am 89 90 97 (A)   Sodium 139 131 (A) 139   Potassium 4.0 3.8 3.8   Chloride 105 98 104   Calcium 9.4 8.8 9.2   Albumin 4.00  3.80   Total Bilirubin 0.8  1.1   Alkaline Phosphatase 68  93   AST (SGOT) 29  54 (A)   ALT (SGPT) 34  64 (A)   (A) Abnormal value       Comments are available for some flowsheets but are not being displayed.           CBC    CBC 7/6/21 7/19/21 12/28/21   WBC 5.65 7.13 7.02   RBC 3.98 4.17 3.84   Hemoglobin 13.1 13.4 13.0   Hematocrit 39.0 39.8 38.4   MCV 98.0 (A) 95.4 100.0 (A)   MCH 32.9 32.1 33.9 (A)   MCHC 33.6 33.7 33.9   RDW 13.3 13.2 13.1   Platelets 240 227 240   (A) Abnormal value            CBC w/diff    CBC w/Diff 7/6/21 7/19/21 12/28/21   WBC 5.65 7.13 7.02   RBC 3.98 4.17 3.84   Hemoglobin 13.1 13.4 13.0   Hematocrit 39.0 39.8 38.4   MCV 98.0 (A) 95.4 100.0 (A)   MCH 32.9 32.1 33.9 (A)   MCHC 33.6 33.7 33.9   RDW 13.3 13.2 13.1   Platelets 240 227 240   Neutrophil Rel % 46.2 52.9 54.6   Immature Granulocyte Rel %  0.4    Lymphocyte Rel % 43.5 37.4 37.0   Monocyte Rel % 9.4 8.7 8.1   Eosinophil Rel % 0.0 (A) 0.0 (A) 0.0 (A)   Basophil Rel % 0.9 0.6 0.3   (A) Abnormal value            Lipid Panel    Lipid Panel 7/6/21   Total Cholesterol 133 (A)   Triglycerides 72   HDL Cholesterol 58   VLDL Cholesterol 14   LDL Cholesterol  61   LDL/HDL Ratio 1.04   (A) Abnormal value            TSH    TSH 7/6/21   TSH 1.430           Data reviewed: Radiologic studies cxr results are reviewed           Assessment and Plan    Diagnoses and all orders for this visit:    1. COPD with exacerbation (HCC) (Primary)  -     BNP; Future  -     CBC & Differential; Future  -     Comprehensive metabolic panel; Future  -     XR Chest 2 View  -     methylPREDNISolone acetate (DEPO-medrol) injection 60 mg  -     predniSONE (DELTASONE) 20 MG tablet; Take 1  tablet by mouth 2 (Two) Times a Day for 5 days.  Dispense: 10 tablet; Refill: 0    2. Shortness of breath  -     BNP; Future  -     CBC & Differential; Future  -     Comprehensive metabolic panel; Future  -     XR Chest 2 View  -     methylPREDNISolone acetate (DEPO-medrol) injection 60 mg  -     predniSONE (DELTASONE) 20 MG tablet; Take 1 tablet by mouth 2 (Two) Times a Day for 5 days.  Dispense: 10 tablet; Refill: 0    3. Leg swelling  Comments:  BLE  Orders:  -     BNP; Future  -     CBC & Differential; Future  -     Comprehensive metabolic panel; Future  -     XR Chest 2 View  -     methylPREDNISolone acetate (DEPO-medrol) injection 60 mg  -     predniSONE (DELTASONE) 20 MG tablet; Take 1 tablet by mouth 2 (Two) Times a Day for 5 days.  Dispense: 10 tablet; Refill: 0    I will obtain BMP, CMP, CBC, and chest x-ray today and she will be informed of results by phone. She is advised to elevate lower extremities above heart level and wear NEETA hose bilaterally. She was given a Depo-Medrol injection in office today and it was tolerated well. We will prescribe prednisone to follow this if needed. Over the weekend, if she is still having complaints of wheezing and shortness of breath, she is advised to increase potassium and decrease sodium in diet. She will increase Lasix to 40 mg daily starting today and then call back Monday to let me know how she is doing. If ambulatory O2 drops below 89 percent on room air, then she is likely to be prescribed O2. A prescription is sent to [facility] if so. She will follow up with pulmonology as scheduled and may need to consider following up with cardiology as well depending on above lab results. All questions and concerns are addressed with understanding noted. The patient is in agreement to above plan.    [facility]ed to [facility] for oxygen at 2L per NC to use prn , dx copd. Pt aware.     I spent 33 minutes caring for Angelica on this date of service. This time includes  time spent by me in the following activities:preparing for the visit, reviewing tests, performing a medically appropriate examination and/or evaluation , counseling and educating the patient/family/caregiver, ordering medications, tests, or procedures and documenting information in the medical record  Follow Up   Return if symptoms worsen or fail to improve, for Recheck, or sooner as needed.  Patient was given instructions and counseling regarding her condition or for health maintenance advice. Please see specific information pulled into the AVS if appropriate.     Transcribed from ambient dictation for TOAN Mckeon by Leeroy Duke.  12/28/21   22:01 CST    Patient verbalized consent to the visit recording.  I have personally performed the services described in this document as transcribed by the above individual, and it is both accurate and complete.  TOAN Mckeon  12/29/2021  15:42 CST

## 2022-05-09 ENCOUNTER — OFFICE VISIT (OUTPATIENT)
Dept: FAMILY MEDICINE CLINIC | Facility: CLINIC | Age: 72
End: 2022-05-09

## 2022-05-09 VITALS — HEIGHT: 66 IN | WEIGHT: 167 LBS | BODY MASS INDEX: 26.84 KG/M2

## 2022-05-09 DIAGNOSIS — J44.1 CHRONIC OBSTRUCTIVE PULMONARY DISEASE WITH ACUTE EXACERBATION: Primary | ICD-10-CM

## 2022-05-09 PROCEDURE — 99443 PR PHYS/QHP TELEPHONE EVALUATION 21-30 MIN: CPT | Performed by: NURSE PRACTITIONER

## 2022-05-09 RX ORDER — AZITHROMYCIN 250 MG/1
TABLET, FILM COATED ORAL
Qty: 6 TABLET | Refills: 0 | Status: SHIPPED | OUTPATIENT
Start: 2022-05-09 | End: 2022-05-17

## 2022-05-09 RX ORDER — PREDNISONE 20 MG/1
20 TABLET ORAL 2 TIMES DAILY
Qty: 10 TABLET | Refills: 0 | Status: SHIPPED | OUTPATIENT
Start: 2022-05-09 | End: 2022-05-14

## 2022-05-09 NOTE — PROGRESS NOTES
"This was an audio and video enabled telemedicine encounter.    Chief Complaint  COPD    Subjective          Angelica Marlin Call presents to [facility]  History of Present Illness     The patient presents today via video visit for breathing issues. She has a history of COPD.     She reports on 05/06/2022 she began noticing becoming short of breath when ambulating to her mailbox. The patient adds she is able to walk to her kitchen without being significantly out of breath. She notes she was previously prescribed steroids and the steroids were helpful. The patient reports she is producing a thick, yellow colored phlegm. She reports she has been doing well until recently. The patient endorses it has been at least 3 months since she was last prescribed steroids. She has copd and states her symptoms to her are similar to when she has had flares in the past with copd exacerbations. She does have oxygen at home to use prn but states she has not been using that much at all lately. She states she does not like to use that unless she has to.     The patient notes she plans to contact Dr. Witt's office so that her medical records from his office can be sent to this office. She adds she would like to be seen at this office regularly going forward. She endorses taking Percocet and Dr. Witt has been managing her Percocet prescription.     She notes she has been receiving a 3 month supply of her inhalers and she states she does not need refills of her inhalers at present. The patient denies tobacco use.     Objective   Vital Signs:  Ht 167.6 cm (66\")   Wt 75.8 kg (167 lb)   BMI 26.95 kg/m²     BMI is >= 25 and < 30. (Overweight) The following options were offered after discussion: exercise counseling/recommendations and nutrition counseling/recommendations      Physical Exam  Constitutional:       Comments: Deferred, telephone visit.        Result Review :               "   Assessment and Plan    Diagnoses and all orders for this visit:    1. Chronic obstructive pulmonary disease with acute exacerbation (HCC) (Primary)    Other orders  -     azithromycin (Zithromax Z-Juliano) 250 MG tablet; Take 2 po now and 1 po d2-5  Dispense: 6 tablet; Refill: 0  -     predniSONE (DELTASONE) 20 MG tablet; Take 1 tablet by mouth 2 (Two) Times a Day for 5 days.  Dispense: 10 tablet; Refill: 0      I will prescribe her prednisone and Zithromax as above. She will continue using her inhalers as prescribed previously. If her symptoms should persist or worsen she will let me know. The patient does have oxygen to use as needed at home. She mentioned wanting to switch all of her care here from [place] as she is living here now; however, she is informed that unfortunately I cannot write Percocet for her. At this point she will continue to see Dr. Witt in [place] secondary to maintaining the Percocet prescription. We will consider options of either referring her to pain clinic for Percocet prescription management and establishing here with me, or potentially establishing with an MD locally as she has moved here. She will consider these options and she will inform me of her decision in the future.     She will follow up here if her symptoms should persist or worsen.    All questions and concerns are addressed with understanding noted. The patient is in agreement to above plan.       I spent 22 minutes caring for Angelica on this date of service. This time includes time spent by me in the following activities:preparing for the visit, obtaining and/or reviewing a separately obtained history, counseling and educating the patient/family/caregiver, ordering medications, tests, or procedures and documenting information in the medical record  Follow Up   Return if symptoms worsen or fail to improve, for Recheck, or sooner as needed.  Patient was given instructions and counseling regarding her condition or for  health maintenance advice. Please see specific information pulled into the AVS if appropriate.     Unable to complete visit using a video connection to the patient. A phone visit was used to complete this visits. Total time of discussion was 22 minutes.    You have chosen to receive care through a telephone visit. Do you consent to use a telephone visit for your medical care today? yes    Transcribed from ambient dictation for TOAN Mckeon by Lizzie Lombardi.  05/09/22   12:34 CDT    Patient verbalized consent to the visit recording.  I have personally performed the services described in this document as transcribed by the above individual, and it is both accurate and complete.  TOAN Mckeon  5/10/2022  14:30 CDT

## 2022-05-17 ENCOUNTER — LAB (OUTPATIENT)
Dept: LAB | Facility: OTHER | Age: 72
End: 2022-05-17

## 2022-05-17 ENCOUNTER — OFFICE VISIT (OUTPATIENT)
Dept: FAMILY MEDICINE CLINIC | Facility: CLINIC | Age: 72
End: 2022-05-17

## 2022-05-17 VITALS
WEIGHT: 167 LBS | OXYGEN SATURATION: 97 % | BODY MASS INDEX: 26.84 KG/M2 | HEART RATE: 76 BPM | HEIGHT: 66 IN | SYSTOLIC BLOOD PRESSURE: 120 MMHG | DIASTOLIC BLOOD PRESSURE: 68 MMHG

## 2022-05-17 DIAGNOSIS — Z00.00 MEDICARE ANNUAL WELLNESS VISIT, SUBSEQUENT: Primary | ICD-10-CM

## 2022-05-17 DIAGNOSIS — L02.92 BOIL: ICD-10-CM

## 2022-05-17 DIAGNOSIS — T14.8XXA OPEN WOUND: ICD-10-CM

## 2022-05-17 PROCEDURE — 87205 SMEAR GRAM STAIN: CPT | Performed by: NURSE PRACTITIONER

## 2022-05-17 PROCEDURE — 99213 OFFICE O/P EST LOW 20 MIN: CPT | Performed by: NURSE PRACTITIONER

## 2022-05-17 PROCEDURE — 96160 PT-FOCUSED HLTH RISK ASSMT: CPT | Performed by: NURSE PRACTITIONER

## 2022-05-17 PROCEDURE — G0439 PPPS, SUBSEQ VISIT: HCPCS | Performed by: NURSE PRACTITIONER

## 2022-05-17 PROCEDURE — 87070 CULTURE OTHR SPECIMN AEROBIC: CPT | Performed by: NURSE PRACTITIONER

## 2022-05-17 PROCEDURE — 96372 THER/PROPH/DIAG INJ SC/IM: CPT | Performed by: NURSE PRACTITIONER

## 2022-05-17 PROCEDURE — 1159F MED LIST DOCD IN RCRD: CPT | Performed by: NURSE PRACTITIONER

## 2022-05-17 RX ORDER — SULFAMETHOXAZOLE AND TRIMETHOPRIM 800; 160 MG/1; MG/1
1 TABLET ORAL 2 TIMES DAILY
Qty: 20 TABLET | Refills: 0 | Status: SHIPPED | OUTPATIENT
Start: 2022-05-17 | End: 2022-05-27

## 2022-05-17 RX ORDER — CEFTRIAXONE 1 G/1
1 INJECTION, POWDER, FOR SOLUTION INTRAMUSCULAR; INTRAVENOUS EVERY 24 HOURS
Status: COMPLETED | OUTPATIENT
Start: 2022-05-17 | End: 2022-05-17

## 2022-05-17 RX ADMIN — CEFTRIAXONE 1 G: 1 INJECTION, POWDER, FOR SOLUTION INTRAMUSCULAR; INTRAVENOUS at 12:42

## 2022-05-17 NOTE — PROGRESS NOTES
The ABCs of the Annual Wellness Visit  Initial Medicare Wellness Visit    Chief Complaint   Patient presents with   • Abscess     Boil like lesion on upper back   • Medicare Wellness-Initial Visit     Subjective   History of Present Illness:  Angelica Call is a 72 y.o. female who presents for an Initial Medicare Wellness Visit.    The following portions of the patient's history were reviewed and   updated as appropriate: allergies, current medications, past family history, past medical history, past social history, past surgical history and problem list.     Compared to one year ago, the patient feels her physical   health is the same.    Compared to one year ago, the patient feels her mental   health is the same.    Recent Hospitalizations:  She was not admitted to the hospital during the last year.       Current Medical Providers:  Patient Care Team:  Marylu Torre APRN as PCP - General (Family Medicine)    Outpatient Medications Prior to Visit   Medication Sig Dispense Refill   • albuterol (PROVENTIL) (2.5 MG/3ML) 0.083% nebulizer solution Take 2.5 mg by nebulization Every 4 (Four) Hours As Needed for Wheezing. 1 each 11   • albuterol sulfate  (90 Base) MCG/ACT inhaler Inhale 2 puffs Every 6 (Six) Hours As Needed.     • Alphagan P 0.1 % solution ophthalmic solution Administer 1 drop to both eyes 2 (Two) Times a Day.     • aspirin 81 MG EC tablet Aspir-Low 81 mg tablet,delayed release     • atorvastatin (LIPITOR) 80 MG tablet Take 80 mg by mouth Every Night.     • carvedilol (COREG) 6.25 MG tablet Take 6.25 mg by mouth 2 (Two) Times a Day With Meals.     • clopidogrel (PLAVIX) 75 MG tablet Take 1 tablet by mouth Daily. 30 tablet 11   • furosemide (LASIX) 20 MG tablet Take 20 mg by mouth Daily.     • nitroglycerin (NITROSTAT) 0.4 MG SL tablet nitroglycerin 0.4 mg sublingual tablet     • oxyCODONE-acetaminophen (PERCOCET) 7.5-325 MG per tablet Take 1 tablet by mouth 3 (Three) Times a Day As Needed. for  "pain     • Spiriva HandiHaler 18 MCG per inhalation capsule INHALE CONTENTS OF 1 CAPSULE ONCE DAILY USING HANDIHALER     • Wixela Inhub 250-50 MCG/DOSE DISKUS Inhale 1 puff 2 (Two) Times a Day.     • denosumab (PROLIA) 60 MG/ML solution prefilled syringe syringe Inject 1 mL under the skin into the appropriate area as directed 1 (One) Time for 1 dose. 180 mL 1   • azithromycin (Zithromax Z-Juliano) 250 MG tablet Take 2 po now and 1 po d2-5 6 tablet 0     No facility-administered medications prior to visit.       Opioid medication/s are on active medication list.  and I have evaluated her active treatment plan and pain score trends (see table).  Vitals:    05/17/22 1121   PainSc: 0-No pain     I have reviewed the chart for potential of high risk medication and harmful drug interactions in the elderly.            Aspirin is on active medication list. Aspirin use is indicated based on review of current medical condition/s. Pros and cons of this therapy have been discussed today. Benefits of this medication outweigh potential harm.  Patient has been encouraged to continue taking this medication.  .      Patient Active Problem List   Diagnosis   • Acute myocardial infarction of inferior wall (HCC)   • Chest pain   • Chronic midline low back pain without sciatica   • COPD (chronic obstructive pulmonary disease) (HCC)   • Coronary arteriosclerosis   • Dyspnea   • Ex-smoker   • History of lung cancer   • Hyperlipidemia   • Multiple lung nodules   • STEMI (ST elevation myocardial infarction) (HCC)   • Stented coronary artery   • Abnormal nuclear cardiac imaging test     Advance Care Planning  Advance Directive is not on file.  ACP discussion was held with the patient during this visit. Patient has an advance directive (not in EMR), copy requested.          Objective       Vitals:    05/17/22 1121   BP: 120/68   Pulse: 76   SpO2: 97%   Weight: 75.8 kg (167 lb)   Height: 167.6 cm (66\")   PainSc: 0-No pain     BMI is >= 25 and < 30. " (Overweight) The following options were offered after discussion: exercise counseling/recommendations and nutrition counseling/recommendations  Does the patient have evidence of cognitive impairment? No    Physical Exam          HEALTH RISK ASSESSMENT    Smoking Status:  Social History     Tobacco Use   Smoking Status Former Smoker   • Packs/day: 1.00   • Years: 30.00   • Pack years: 30.00   • Types: Cigarettes   • Quit date:    • Years since quittin.4   Smokeless Tobacco Never Used     Alcohol Consumption:  Social History     Substance and Sexual Activity   Alcohol Use Never     Fall Risk Screen:    STEADI Fall Risk Assessment was completed, and patient is at LOW risk for falls.Assessment completed on:2022    Depression Screen:   PHQ-2/PHQ-9 Depression Screening 2022   Retired PHQ-9 Total Score -   Retired Total Score -   Little Interest or Pleasure in Doing Things 0-->not at all   Feeling Down, Depressed or Hopeless 0-->not at all   PHQ-9: Brief Depression Severity Measure Score 0       Health Habits and Functional and Cognitive Screening:  Functional & Cognitive Status 2022   Do you have difficulty preparing food and eating? No   Do you have difficulty bathing yourself, getting dressed or grooming yourself? No   Do you have difficulty using the toilet? No   Do you have difficulty moving around from place to place? No   Do you have trouble with steps or getting out of a bed or a chair? No   Current Diet Well Balanced Diet   Dental Exam Up to date   Eye Exam Up to date   Exercise (times per week) 7 times per week   Current Exercises Include Walking   Do you need help using the phone?  No   Are you deaf or do you have serious difficulty hearing?  No   Do you need help with transportation? No   Do you need help shopping? No   Do you need help preparing meals?  No   Do you need help with housework?  No   Do you need help with laundry? No   Do you need help taking your medications? No   Do you  need help managing money? No   Do you ever drive or ride in a car without wearing a seat belt? Yes   Have you felt unusual stress, anger or loneliness in the last month? No   Who do you live with? Alone   If you need help, do you have trouble finding someone available to you? No   Have you been bothered in the last four weeks by sexual problems? No   Do you have difficulty concentrating, remembering or making decisions? No       Age-appropriate Screening Schedule:  Refer to the list below for future screening recommendations based on patient's age, sex and/or medical conditions. Orders for these recommended tests are listed in the plan section. The patient has been provided with a written plan.    Health Maintenance   Topic Date Due   • MAMMOGRAM  Never done   • TDAP/TD VACCINES (1 - Tdap) Never done   • ZOSTER VACCINE (1 of 2) Never done   • LIPID PANEL  07/06/2022   • INFLUENZA VACCINE  08/01/2022   • DXA SCAN  08/18/2023            Assessment & Plan   CMS Preventative Services Quick Reference  Risk Factors Identified During Encounter  Cardiovascular Disease  Fall Risk-High or Moderate  Immunizations Discussed/Encouraged (specific Immunizations; Tdap and Shingrix  Polypharmacy  The above risks/problems have been discussed with the patient.  Follow up actions/plans if indicated are seen below in the Assessment/Plan Section.  Pertinent information has been shared with the patient in the After Visit Summary.    Diagnoses and all orders for this visit:    1. Medicare annual wellness visit, subsequent (Primary)    2. Boil  -     Wound Culture - Wound, Back, Upper; Future  -     Wound Culture - Wound, Back, Upper  -     cefTRIAXone (ROCEPHIN) injection 1 g    3. Open wound    Other orders  -     sulfamethoxazole-trimethoprim (Bactrim DS) 800-160 MG per tablet; Take 1 tablet by mouth 2 (Two) Times a Day for 10 days.  Dispense: 20 tablet; Refill: 0        Follow Up:  Return if symptoms worsen or fail to improve, for  Recheck, or sooner as needed.     An After Visit Summary and PPPS were made available to the patient.      I spent 33 minutes caring for Angelica on this date of service. This time includes time spent by me in the following activities:preparing for the visit, performing a medically appropriate examination and/or evaluation , counseling and educating the patient/family/caregiver, ordering medications, tests, or procedures and documenting information in the medical record

## 2022-05-20 LAB
BACTERIA SPEC AEROBE CULT: NORMAL
GRAM STN SPEC: NORMAL
GRAM STN SPEC: NORMAL

## 2022-05-23 ENCOUNTER — OFFICE VISIT (OUTPATIENT)
Dept: FAMILY MEDICINE CLINIC | Facility: CLINIC | Age: 72
End: 2022-05-23

## 2022-05-23 VITALS
HEART RATE: 65 BPM | OXYGEN SATURATION: 97 % | WEIGHT: 167 LBS | HEIGHT: 66 IN | SYSTOLIC BLOOD PRESSURE: 120 MMHG | DIASTOLIC BLOOD PRESSURE: 72 MMHG | BODY MASS INDEX: 26.84 KG/M2

## 2022-05-23 DIAGNOSIS — S21.209D OPEN WOUND OF BACK, UNSPECIFIED LATERALITY, SUBSEQUENT ENCOUNTER: Primary | ICD-10-CM

## 2022-05-23 PROCEDURE — 99214 OFFICE O/P EST MOD 30 MIN: CPT | Performed by: NURSE PRACTITIONER

## 2022-05-23 RX ORDER — IBUPROFEN 800 MG/1
800 TABLET ORAL EVERY 6 HOURS PRN
Qty: 60 TABLET | Refills: 2 | Status: SHIPPED | OUTPATIENT
Start: 2022-05-23

## 2022-05-23 RX ORDER — NITROGLYCERIN 0.4 MG/1
TABLET SUBLINGUAL
Qty: 100 TABLET | Refills: 0 | Status: SHIPPED | OUTPATIENT
Start: 2022-05-23

## 2022-05-23 NOTE — PROGRESS NOTES
"Chief Complaint  Abscess (Boil like lesion on upper back) and Medicare Wellness-Initial Visit    Subjective          Angelica Call presents to [facility] [place]  History of Present Illness  Pt here today with complaints of a boil on the upper back which she noticed a few days ago. She denies fever , reports area started draining yesterday which is what prompted to bring her in the office today. She denies chills, nausea, vomiting. States her friend can help her bandage the area if needed.     Objective   Vital Signs:  /68   Pulse 76   Ht 167.6 cm (66\")   Wt 75.8 kg (167 lb)   SpO2 97%   BMI 26.95 kg/m²   BMI is >= 25 and < 30. (Overweight) The following options were offered after discussion: exercise counseling/recommendations and nutrition counseling/recommendations      Physical Exam  Vitals and nursing note reviewed.   Constitutional:       General: She is not in acute distress.     Appearance: Normal appearance. She is not ill-appearing, toxic-appearing or diaphoretic.   HENT:      Head: Normocephalic and atraumatic.   Cardiovascular:      Rate and Rhythm: Normal rate.   Pulmonary:      Effort: Pulmonary effort is normal.   Skin:     General: Skin is warm and dry.      Findings: Abscess, erythema and wound present.             Comments: She has approx 2 inches in diameter a wound with center having 2 dime-sized open wounds , one of which is draining a moderate amount of serosang drainage which is malodorous. Area is tender to palpation, surrounding area is erythematous. Area slightly warm to touch as well   Neurological:      Mental Status: She is alert and oriented to person, place, and time.        Result Review :                 Assessment and Plan    Diagnoses and all orders for this visit:    1. Medicare annual wellness visit, subsequent (Primary)    2. Boil  -     Wound Culture - Wound, Back, Upper; Future  -     Wound Culture - Wound, Back, " Upper  -     cefTRIAXone (ROCEPHIN) injection 1 g    3. Open wound    Other orders  -     sulfamethoxazole-trimethoprim (Bactrim DS) 800-160 MG per tablet; Take 1 tablet by mouth 2 (Two) Times a Day for 10 days.  Dispense: 20 tablet; Refill: 0    since wound is already draining it is drained even more in the office by myself. Pt tolerated fairly well. She is given rocephin 1gm IM in office today and is started on bactrim ds as above. Wound is packed and she is advised to change this as a wet to dry dressing bid, she states her friend will do this for her. She will RTC on Monday for recheck, sooner if needed. Depending on wound at that time may need referral to wound clinic. Pt aware and in agreement to this plan. Wound cx obtained today as well. Advised to use antibacterial soap when cleansing. All questions and concerns addressed with understanding notes.     I spent 33 minutes caring for Angelica on this date of service. This time includes time spent by me in the following activities:preparing for the visit, performing a medically appropriate examination and/or evaluation , counseling and educating the patient/family/caregiver, ordering medications, tests, or procedures and documenting information in the medical record  Follow Up   Return if symptoms worsen or fail to improve, for Recheck, or sooner as needed.  Patient was given instructions and counseling regarding her condition or for health maintenance advice. Please see specific information pulled into the AVS if appropriate.

## 2022-05-23 NOTE — PROGRESS NOTES
"Chief Complaint  Abscess (Boil on back. 1 week follow up/) and Med Refill    Subjective          Angelica Marlin Call presents to [facility] [place]  History of Present Illness     Patient here today for recheck of wound on upper back.  She was seen last week wound was draining, she was started on Bactrim and given Rocephin 1 g in the office and her friend has been performing the wound care for her.  She denies fever or chills and states that her pain has improved and she feels like area is better than it was last week.    Objective   Vital Signs:  /72   Pulse 65   Ht 167.6 cm (66\")   Wt 75.8 kg (167 lb)   SpO2 97%   BMI 26.95 kg/m²   BMI is >= 25 and < 30. (Overweight) The following options were offered after discussion: exercise counseling/recommendations and nutrition counseling/recommendations      Physical Exam  Vitals and nursing note reviewed.   Constitutional:       General: She is not in acute distress.     Appearance: Normal appearance. She is not ill-appearing, toxic-appearing or diaphoretic.   HENT:      Head: Normocephalic and atraumatic.   Cardiovascular:      Rate and Rhythm: Normal rate.   Pulmonary:      Effort: Pulmonary effort is normal.   Skin:     General: Skin is warm and dry.      Findings: Wound present.             Comments: Wound to upper mid back without surrounding erythema today, she is still somewhat tender to palpation but this is improved as well. Wound draining a small amt of serous drainage. Tunneling smaller but also still present.    Neurological:      Mental Status: She is alert and oriented to person, place, and time.   Psychiatric:         Mood and Affect: Mood normal.         Behavior: Behavior normal.         Thought Content: Thought content normal.         Judgment: Judgment normal.        Result Review :                 Assessment and Plan    Diagnoses and all orders for this visit:    1. Open wound of back, unspecified " laterality, subsequent encounter (Primary)    Other orders  -     nitroglycerin (NITROSTAT) 0.4 MG SL tablet; Take no more than 3 doses in 15 minutes.  Dispense: 100 tablet; Refill: 0  -     ibuprofen (ADVIL,MOTRIN) 800 MG tablet; Take 1 tablet by mouth Every 6 (Six) Hours As Needed for Mild Pain .  Dispense: 60 tablet; Refill: 2    wound cx results are reviewed, she is going to continue on bactrim until gone. Would is expressed again with little results of serosanguinous drg. No odor today. Pt tolerated procedure well and wet to dry bandage reapplied, tunneling of wound packed and covered with gauze. Advised to continue with with wet to dry dressing, packing as much as she can until wound healed. We did discuss referral on to wound center however since the area has improved as well as it has since her visit 1 week ago I will hold off on referral today. She is asked to call back if she notices this worsening and referral can be made at that time if nec. She will otherwise follow up here in 2 weeks for recheck. Friend to continue with wound care. All questions and concerns addressed with understanding noted. She is aware and is in agreement to this plan.       I spent 33 minutes caring for Angelica on this date of service. This time includes time spent by me in the following activities:preparing for the visit, reviewing tests, performing a medically appropriate examination and/or evaluation , counseling and educating the patient/family/caregiver and documenting information in the medical record  Follow Up   Return in 2 weeks (on 6/6/2022), or if symptoms worsen or fail to improve, for Recheck, or sooner as needed.  Patient was given instructions and counseling regarding her condition or for health maintenance advice. Please see specific information pulled into the AVS if appropriate.

## 2022-06-06 ENCOUNTER — OFFICE VISIT (OUTPATIENT)
Dept: FAMILY MEDICINE CLINIC | Facility: CLINIC | Age: 72
End: 2022-06-06

## 2022-06-06 VITALS
SYSTOLIC BLOOD PRESSURE: 110 MMHG | OXYGEN SATURATION: 94 % | HEIGHT: 66 IN | DIASTOLIC BLOOD PRESSURE: 70 MMHG | WEIGHT: 167 LBS | BODY MASS INDEX: 26.84 KG/M2 | HEART RATE: 70 BPM

## 2022-06-06 DIAGNOSIS — S21.209D OPEN WOUND OF BACK, UNSPECIFIED LATERALITY, SUBSEQUENT ENCOUNTER: Primary | ICD-10-CM

## 2022-06-06 PROCEDURE — 99213 OFFICE O/P EST LOW 20 MIN: CPT | Performed by: NURSE PRACTITIONER

## 2022-06-06 RX ORDER — LATANOPROST 50 UG/ML
SOLUTION/ DROPS OPHTHALMIC
COMMUNITY
Start: 2022-05-25 | End: 2023-02-09 | Stop reason: SDUPTHER

## 2022-06-06 NOTE — PROGRESS NOTES
"Chief Complaint  Abscess (2 week follow up)    Subjective        Angelica Call presents to [facility] [place]  History of Present Illness  Pt here today for recheck of open wound , back. She was packing wound for the first week as advised however this last week she has not been able to pack because her family and friends were either sick or out of town. She states she thinks area is now healed. Has not noticed any drainage in the last few days. Denies fever. Has finished antibiotics.     Objective   Vital Signs:  /70   Pulse 70   Ht 167.6 cm (66\")   Wt 75.8 kg (167 lb)   SpO2 94%   BMI 26.95 kg/m²     BMI is >= 25 and < 30. (Overweight) The following options were offered after discussion: exercise counseling/recommendations and nutrition counseling/recommendations      Physical Exam  Vitals and nursing note reviewed.   Constitutional:       General: She is not in acute distress.     Appearance: Normal appearance. She is not ill-appearing, toxic-appearing or diaphoretic.   HENT:      Head: Normocephalic and atraumatic.   Cardiovascular:      Rate and Rhythm: Normal rate.   Pulmonary:      Effort: Pulmonary effort is normal.   Skin:     General: Skin is warm and dry.      Coloration: Skin is not jaundiced or pale.      Findings: Wound (healing  on exam today, area encircled on graph is indented and with very little cloudy white drainage noted on exam after wound manipulated but not actively draining on its own. no surrounding pain or erythema noted. not warm to touch either.) present. No bruising, erythema, lesion or rash.          Neurological:      Mental Status: She is alert and oriented to person, place, and time.   Psychiatric:         Mood and Affect: Mood normal.         Behavior: Behavior normal.         Thought Content: Thought content normal.         Judgment: Judgment normal.        Result Review :                Assessment and Plan   Diagnoses and " all orders for this visit:    1. Open wound of back, unspecified laterality, subsequent encounter (Primary)  Comments:  healing    bandaid is applied to wound and she is advised that area has improved dramatically from last visit. She is however advised to monitor this and if wound opens back up or becomes erythematous or hot again to come back for recheck. She is aware and is in agreement to this plan. All questions and concerns addressed with understanding noted.      I spent 22 minutes caring for Angelica on this date of service. This time includes time spent by me in the following activities:preparing for the visit, performing a medically appropriate examination and/or evaluation , counseling and educating the patient/family/caregiver and documenting information in the medical record  Follow Up   Return if symptoms worsen or fail to improve, for Recheck, or sooner as needed.  Patient was given instructions and counseling regarding her condition or for health maintenance advice. Please see specific information pulled into the AVS if appropriate.

## 2022-09-01 RX ORDER — METHYLPREDNISOLONE 4 MG/1
TABLET ORAL
Qty: 1 EACH | Refills: 0 | Status: SHIPPED | OUTPATIENT
Start: 2022-09-01 | End: 2022-10-27 | Stop reason: SDUPTHER

## 2022-10-19 RX ORDER — OXYCODONE AND ACETAMINOPHEN 7.5; 325 MG/1; MG/1
1 TABLET ORAL 3 TIMES DAILY PRN
OUTPATIENT
Start: 2022-10-19

## 2022-10-27 ENCOUNTER — DOCUMENTATION (OUTPATIENT)
Dept: FAMILY MEDICINE CLINIC | Facility: CLINIC | Age: 72
End: 2022-10-27

## 2022-10-27 RX ORDER — AZITHROMYCIN 250 MG/1
TABLET, FILM COATED ORAL
Qty: 6 TABLET | Refills: 0 | Status: SHIPPED | OUTPATIENT
Start: 2022-10-27 | End: 2022-11-10

## 2022-10-27 RX ORDER — METHYLPREDNISOLONE 4 MG/1
TABLET ORAL
Qty: 1 EACH | Refills: 0 | Status: SHIPPED | OUTPATIENT
Start: 2022-10-27 | End: 2022-11-10

## 2022-11-10 ENCOUNTER — OFFICE VISIT (OUTPATIENT)
Dept: FAMILY MEDICINE CLINIC | Facility: CLINIC | Age: 72
End: 2022-11-10

## 2022-11-10 VITALS
SYSTOLIC BLOOD PRESSURE: 120 MMHG | DIASTOLIC BLOOD PRESSURE: 74 MMHG | WEIGHT: 167 LBS | HEART RATE: 80 BPM | OXYGEN SATURATION: 95 % | TEMPERATURE: 97 F | BODY MASS INDEX: 26.84 KG/M2 | HEIGHT: 66 IN

## 2022-11-10 DIAGNOSIS — Z85.118 HISTORY OF LUNG CANCER: ICD-10-CM

## 2022-11-10 DIAGNOSIS — J18.9 PNEUMONIA DUE TO INFECTIOUS ORGANISM, UNSPECIFIED LATERALITY, UNSPECIFIED PART OF LUNG: ICD-10-CM

## 2022-11-10 DIAGNOSIS — J40 BRONCHITIS: ICD-10-CM

## 2022-11-10 DIAGNOSIS — J44.0 CHRONIC OBSTRUCTIVE PULMONARY DISEASE WITH ACUTE LOWER RESPIRATORY INFECTION: Primary | ICD-10-CM

## 2022-11-10 PROCEDURE — 96372 THER/PROPH/DIAG INJ SC/IM: CPT | Performed by: NURSE PRACTITIONER

## 2022-11-10 PROCEDURE — 99214 OFFICE O/P EST MOD 30 MIN: CPT | Performed by: NURSE PRACTITIONER

## 2022-11-10 RX ORDER — TRIAMCINOLONE ACETONIDE 40 MG/ML
60 INJECTION, SUSPENSION INTRA-ARTICULAR; INTRAMUSCULAR ONCE
Status: COMPLETED | OUTPATIENT
Start: 2022-11-10 | End: 2022-11-10

## 2022-11-10 RX ORDER — CEFTRIAXONE 1 G/1
1 INJECTION, POWDER, FOR SOLUTION INTRAMUSCULAR; INTRAVENOUS ONCE
Status: COMPLETED | OUTPATIENT
Start: 2022-11-10 | End: 2022-11-10

## 2022-11-10 RX ORDER — PREDNISONE 20 MG/1
20 TABLET ORAL 2 TIMES DAILY
Qty: 10 TABLET | Refills: 0 | Status: SHIPPED | OUTPATIENT
Start: 2022-11-10 | End: 2022-11-15

## 2022-11-10 RX ADMIN — TRIAMCINOLONE ACETONIDE 60 MG: 40 INJECTION, SUSPENSION INTRA-ARTICULAR; INTRAMUSCULAR at 14:04

## 2022-11-10 RX ADMIN — CEFTRIAXONE 1 G: 1 INJECTION, POWDER, FOR SOLUTION INTRAMUSCULAR; INTRAVENOUS at 14:04

## 2022-11-10 NOTE — PROGRESS NOTES
"Subjective   Angelica Call is a 72 y.o. female who presents on 11/10/2022, with complaints of cough and congestion. She states she has been sick for the last month or longer. The patient reports she took the first Z-Juliano approximately on 10/23/2022, or 10/24/2022, and after a while she was feeling better. The patient adds that approximately 1 week later, she felt like her symptoms were returning. She adds she called our office and was prescribed an antibiotic and a steroid. She expresses when she was coughing it was thick but now she feels her cough is thinner. She expresses she is getting short of breath again and notes she is still using her albuterol inhaler. She states she is taking Advair and Spiriva. The patient adds she has an albuterol emergency inhaler that she uses as needed and denies any body aches or fever. The patient reports approximately 3 weeks ago, she woke up in the bed and felt like she could not breathe. She states she checked her oxygen saturation and her heart rate was 60 to 80 beats per minute. The patient reports she went to see her cardiologist approximately 1 week ago and he told her that her heart rate was good. She adds she did experience chills that night and adds she has been doing better since then. She expresses she feels like she has a cold. The patient reports she sees a pulmonologist regularly and reveals she has not had any steroid injections recently. The patient states she experienced a nosebleed after blowing her nose once a few days ago. She states she does not experience nose bleeds frequently.       Vitals:    11/10/22 1310   BP: 120/74   Pulse: 80   Temp: 97 °F (36.1 °C)   SpO2: 95%   Weight: 75.8 kg (167 lb)   Height: 167.6 cm (66\")   PainSc: 0-No pain     Body mass index is 26.95 kg/m².  Past Medical History:   Diagnosis Date   • Allergic    • Cataract    • COPD (chronic obstructive pulmonary disease) (HCC)    • Glaucoma    • Hyperlipidemia    • Hypertension  "     History of Present Illness     The following portions of the patient's history were reviewed and updated as appropriate: allergies, current medications, past family history, past medical history, past social history, past surgical history and problem list.    Review of Systems    Objective   Physical Exam  Vitals and nursing note reviewed.   Constitutional:       General: She is not in acute distress.     Appearance: Normal appearance. She is not ill-appearing, toxic-appearing or diaphoretic.   HENT:      Head: Normocephalic and atraumatic.      Right Ear: Tympanic membrane, ear canal and external ear normal. There is no impacted cerumen.      Left Ear: Tympanic membrane, ear canal and external ear normal. There is no impacted cerumen.   Cardiovascular:      Rate and Rhythm: Normal rate and regular rhythm.      Heart sounds: Normal heart sounds. No murmur heard.    No friction rub. No gallop.   Pulmonary:      Breath sounds: Decreased breath sounds and wheezing present.      Comments: Lung fields diminished throughout. Pulse oximetry on room air is 95 percent. She does have some inspiratory and expiratory wheezes.  Skin:     General: Skin is warm and dry.      Coloration: Skin is not jaundiced or pale.      Findings: No bruising, erythema, lesion or rash.   Neurological:      Mental Status: She is alert and oriented to person, place, and time.      Cranial Nerves: No cranial nerve deficit.      Motor: No weakness.      Gait: Gait normal.   Psychiatric:         Mood and Affect: Mood normal.         Behavior: Behavior normal.         Thought Content: Thought content normal.         Judgment: Judgment normal.         Assessment & Plan   Diagnoses and all orders for this visit:    1. Chronic obstructive pulmonary disease with acute lower respiratory infection (HCC) (Primary)  -     XR Chest 2 View; Future  -     triamcinolone acetonide (KENALOG-40) injection 60 mg  -     cefTRIAXone (ROCEPHIN) injection 1 g    2.  History of lung cancer  -     XR Chest 2 View; Future  -     triamcinolone acetonide (KENALOG-40) injection 60 mg  -     cefTRIAXone (ROCEPHIN) injection 1 g    3. Bronchitis  -     XR Chest 2 View; Future  -     triamcinolone acetonide (KENALOG-40) injection 60 mg  -     cefTRIAXone (ROCEPHIN) injection 1 g    4. Pneumonia due to infectious organism, unspecified laterality, unspecified part of lung  -     cefTRIAXone (ROCEPHIN) injection 1 g    Other orders  -     predniSONE (DELTASONE) 20 MG tablet; Take 1 tablet by mouth 2 (Two) Times a Day for 5 days.  Dispense: 10 tablet; Refill: 0    Plan:    I will prescribe the patient Kenalog 60 mg injection in the office on 11/10/2022. Rocephin 1 g intramuscular in the office on 11/10/2022. She tolerates both well. She is given prednisone to start over the weekend if her symptoms should persist or worsen. I will also obtain chest x-ray today and inform her of results by phone. We may need to additionally give her doxycycline depending upon chest x-ray results. She will certainly return if her symptoms should persist or worsen. All questions and concerns are addressed with understanding noted. The patient is in agreement to above plan.    I spent 30 minutes caring for Mrs. Call on this date of service. This time includes time spent by me in the following activities: preparing for the visit, reviewing tests, performing a medically appropriate examination and/or evaluation, counseling and educating the patient/family/caregiver, ordering medications, tests, or procedures and documenting information in the medical record.      BMI is >= 25 and <30. (Overweight) The following options were offered after discussion;: exercise counseling/recommendations and nutrition counseling/recommendations    Transcribed from ambient dictation for TOAN Mckeon by Haylie Honeycutt.  11/10/22   14:02 CST

## 2022-12-28 ENCOUNTER — OFFICE VISIT (OUTPATIENT)
Dept: FAMILY MEDICINE CLINIC | Facility: CLINIC | Age: 72
End: 2022-12-28

## 2022-12-28 VITALS — BODY MASS INDEX: 26.84 KG/M2 | WEIGHT: 167 LBS | HEIGHT: 66 IN

## 2022-12-28 DIAGNOSIS — J40 BRONCHITIS: Primary | ICD-10-CM

## 2022-12-28 PROCEDURE — 99441 PR PHYS/QHP TELEPHONE EVALUATION 5-10 MIN: CPT | Performed by: NURSE PRACTITIONER

## 2022-12-28 RX ORDER — AZITHROMYCIN 250 MG/1
TABLET, FILM COATED ORAL
Qty: 6 TABLET | Refills: 0 | Status: SHIPPED | OUTPATIENT
Start: 2022-12-28

## 2022-12-28 RX ORDER — PREDNISONE 20 MG/1
20 TABLET ORAL 2 TIMES DAILY
Qty: 10 TABLET | Refills: 0 | Status: SHIPPED | OUTPATIENT
Start: 2022-12-28 | End: 2023-01-02

## 2022-12-28 NOTE — PROGRESS NOTES
CC: URI (Congested, out of breath, cough. Started the 24th. )      Subjective:  Angelica Call is a 72 y.o. female who presents for         This is a telephone visit because of pandemic.  Patient is at her home and I am in my office.  Patient of TOAN Santoro with complaints of bronchitis symptoms. C/O symptoms since 12/23/2022. She has a cough and constantly has to clear her throat. She has coughed so much that her head hurts. Coughing up green phlegm and clear at times. Did have a subjective fever 3-4 nights ago, but none since then. She states she is blowing clear from her nose when she is able to blow anything out. She denies sinus pain or pressure, sore throat, n/v, or diarrhea. She states this is in her chest more than her head.       The following portions of the patient's history were reviewed and updated as appropriate: allergies, current medications, past family history, past medical history, past social history, past surgical history and problem list.    Past Medical History:   Diagnosis Date   • Allergic    • Cataract    • COPD (chronic obstructive pulmonary disease) (HCC)    • Glaucoma    • Hyperlipidemia    • Hypertension          Current Outpatient Medications:   •  albuterol (PROVENTIL) (2.5 MG/3ML) 0.083% nebulizer solution, Take 2.5 mg by nebulization Every 4 (Four) Hours As Needed for Wheezing., Disp: 1 each, Rfl: 11  •  albuterol sulfate  (90 Base) MCG/ACT inhaler, Inhale 2 puffs Every 6 (Six) Hours As Needed., Disp: , Rfl:   •  Alphagan P 0.1 % solution ophthalmic solution, Administer 1 drop to both eyes 2 (Two) Times a Day., Disp: , Rfl:   •  aspirin 81 MG EC tablet, Aspir-Low 81 mg tablet,delayed release, Disp: , Rfl:   •  atorvastatin (LIPITOR) 80 MG tablet, Take 80 mg by mouth Every Night., Disp: , Rfl:   •  carvedilol (COREG) 6.25 MG tablet, Take 6.25 mg by mouth 2 (Two) Times a Day With Meals., Disp: , Rfl:   •  clopidogrel (PLAVIX) 75 MG tablet, Take 1 tablet by mouth Daily.,  "Disp: 30 tablet, Rfl: 11  •  furosemide (LASIX) 20 MG tablet, Take 20 mg by mouth Daily., Disp: , Rfl:   •  ibuprofen (ADVIL,MOTRIN) 800 MG tablet, Take 1 tablet by mouth Every 6 (Six) Hours As Needed for Mild Pain ., Disp: 60 tablet, Rfl: 2  •  latanoprost (XALATAN) 0.005 % ophthalmic solution, INSTILL 1 DROP IN LEFT EYE EVERY DAY AS DIRECTED, Disp: , Rfl:   •  nitroglycerin (NITROSTAT) 0.4 MG SL tablet, Take no more than 3 doses in 15 minutes., Disp: 100 tablet, Rfl: 0  •  oxyCODONE-acetaminophen (PERCOCET) 7.5-325 MG per tablet, Take 1 tablet by mouth 3 (Three) Times a Day As Needed. for pain, Disp: , Rfl:   •  Spiriva HandiHaler 18 MCG per inhalation capsule, INHALE CONTENTS OF 1 CAPSULE ONCE DAILY USING HANDIHALER, Disp: , Rfl:   •  Wixela Inhub 250-50 MCG/DOSE DISKUS, Inhale 1 puff 2 (Two) Times a Day., Disp: , Rfl:   •  azithromycin (Zithromax Z-Juliano) 250 MG tablet, Take 2 tablets the first day, then 1 tablet daily for 4 days., Disp: 6 tablet, Rfl: 0  •  denosumab (PROLIA) 60 MG/ML solution prefilled syringe syringe, Inject 1 mL under the skin into the appropriate area as directed 1 (One) Time for 1 dose., Disp: 180 mL, Rfl: 1  •  predniSONE (DELTASONE) 20 MG tablet, Take 1 tablet by mouth 2 (Two) Times a Day for 5 days., Disp: 10 tablet, Rfl: 0    Review of Systems    Review of Systems   Constitutional: Positive for fever.   HENT: Negative.  Negative for sinus pressure and sinus pain.    Eyes: Negative.    Respiratory: Positive for cough. Negative for shortness of breath.    Cardiovascular: Negative.    Gastrointestinal: Negative.    Endocrine: Negative.    Genitourinary: Negative.    Musculoskeletal: Negative.    Skin: Negative.    Allergic/Immunologic: Negative.    Neurological: Negative.    Hematological: Negative.    Psychiatric/Behavioral: Negative.    All other systems reviewed and are negative.      Objective  Vitals:    12/28/22 1313   Weight: 75.8 kg (167 lb)   Height: 167.6 cm (66\")     Body mass " index is 26.95 kg/m².    Physical Exam    Physical Exam  Deferred, telephone visit because of pandemic.    Diagnoses and all orders for this visit:    1. Bronchitis (Primary)  -     predniSONE (DELTASONE) 20 MG tablet; Take 1 tablet by mouth 2 (Two) Times a Day for 5 days.  Dispense: 10 tablet; Refill: 0  -     azithromycin (Zithromax Z-Juliano) 250 MG tablet; Take 2 tablets the first day, then 1 tablet daily for 4 days.  Dispense: 6 tablet; Refill: 0         Will treat patient's symptoms with prednisone 20 mg p.o. twice daily for 5 days and a Z-Juliano.  Patient instructed on how to take these medications and possible side effects.  She is advised to increase fluids.  She is advised to take the antibiotic with food to help avoid upset stomach.  She is to follow-up with her PCP TOAN Santoro if no improvement in symptoms.  Answered all questions.  Patient verbalized understanding of plan of care.        This document has been electronically signed by TOAN Santillan on December 28, 2022 13:26 CST     You have chosen to receive care through a telephone visit. Do you consent to use a telephone visit for your medical care today? Yes    This visit has been rescheduled as a phone visit to comply with patient safety concerns in accordance with CDC recommendations. Total time of discussion was 9 minutes.

## 2023-02-13 RX ORDER — LATANOPROST 50 UG/ML
1 SOLUTION/ DROPS OPHTHALMIC DAILY
Qty: 7.5 ML | Refills: 0 | Status: SHIPPED | OUTPATIENT
Start: 2023-02-13

## 2023-04-17 RX ORDER — IBUPROFEN 800 MG/1
800 TABLET ORAL EVERY 6 HOURS PRN
Qty: 60 TABLET | Refills: 2 | Status: SHIPPED | OUTPATIENT
Start: 2023-04-17

## 2023-04-27 ENCOUNTER — TRANSCRIBE ORDERS (OUTPATIENT)
Dept: BONE DENSITY | Facility: CLINIC | Age: 73
End: 2023-04-27
Payer: MEDICARE

## 2023-04-27 DIAGNOSIS — Z78.0 POSTMENOPAUSE: Primary | ICD-10-CM

## 2023-05-01 ENCOUNTER — OFFICE VISIT (OUTPATIENT)
Dept: FAMILY MEDICINE CLINIC | Facility: CLINIC | Age: 73
End: 2023-05-01
Payer: MEDICARE

## 2023-05-01 ENCOUNTER — LAB (OUTPATIENT)
Dept: LAB | Facility: OTHER | Age: 73
End: 2023-05-01
Payer: MEDICARE

## 2023-05-01 VITALS
OXYGEN SATURATION: 96 % | TEMPERATURE: 98.7 F | HEART RATE: 66 BPM | WEIGHT: 152.2 LBS | SYSTOLIC BLOOD PRESSURE: 128 MMHG | DIASTOLIC BLOOD PRESSURE: 72 MMHG | BODY MASS INDEX: 24.46 KG/M2 | HEIGHT: 66 IN

## 2023-05-01 DIAGNOSIS — R30.0 DYSURIA: ICD-10-CM

## 2023-05-01 DIAGNOSIS — R05.2 SUBACUTE COUGH: ICD-10-CM

## 2023-05-01 DIAGNOSIS — J42 CHRONIC BRONCHITIS, UNSPECIFIED CHRONIC BRONCHITIS TYPE: ICD-10-CM

## 2023-05-01 DIAGNOSIS — R30.0 DYSURIA: Primary | ICD-10-CM

## 2023-05-01 DIAGNOSIS — Z11.59 NEED FOR HEPATITIS C SCREENING TEST: ICD-10-CM

## 2023-05-01 DIAGNOSIS — R68.83 CHILLS: ICD-10-CM

## 2023-05-01 DIAGNOSIS — K59.00 CONSTIPATION, UNSPECIFIED CONSTIPATION TYPE: ICD-10-CM

## 2023-05-01 LAB
ALBUMIN SERPL-MCNC: 3.6 G/DL (ref 3.5–5)
ALBUMIN/GLOB SERPL: 1.1 G/DL (ref 1.1–1.8)
ALP SERPL-CCNC: 140 U/L (ref 38–126)
ALT SERPL W P-5'-P-CCNC: 35 U/L
ANION GAP SERPL CALCULATED.3IONS-SCNC: 3 MMOL/L (ref 5–15)
AST SERPL-CCNC: 24 U/L (ref 14–36)
BACTERIA UR QL AUTO: ABNORMAL /HPF
BASOPHILS # BLD AUTO: 0.04 10*3/MM3 (ref 0–0.2)
BASOPHILS NFR BLD AUTO: 0.6 % (ref 0–1.5)
BILIRUB SERPL-MCNC: 0.8 MG/DL (ref 0.2–1.3)
BILIRUB UR QL STRIP: NEGATIVE
BUN SERPL-MCNC: 10 MG/DL (ref 7–23)
BUN/CREAT SERPL: 16.9 (ref 7–25)
CALCIUM SPEC-SCNC: 9.2 MG/DL (ref 8.4–10.2)
CHLORIDE SERPL-SCNC: 101 MMOL/L (ref 101–112)
CLARITY UR: CLEAR
CO2 SERPL-SCNC: 33 MMOL/L (ref 22–30)
COLOR UR: ABNORMAL
CREAT SERPL-MCNC: 0.59 MG/DL (ref 0.52–1.04)
DEPRECATED RDW RBC AUTO: 45.9 FL (ref 37–54)
EGFRCR SERPLBLD CKD-EPI 2021: 95.9 ML/MIN/1.73
EOSINOPHIL # BLD AUTO: 0 10*3/MM3 (ref 0–0.4)
EOSINOPHIL NFR BLD AUTO: 0 % (ref 0.3–6.2)
ERYTHROCYTE [DISTWIDTH] IN BLOOD BY AUTOMATED COUNT: 12.9 % (ref 12.3–15.4)
GLOBULIN UR ELPH-MCNC: 3.2 GM/DL (ref 2.3–3.5)
GLUCOSE SERPL-MCNC: 91 MG/DL (ref 70–99)
GLUCOSE UR STRIP-MCNC: NEGATIVE MG/DL
HCT VFR BLD AUTO: 36.7 % (ref 34–46.6)
HGB BLD-MCNC: 12 G/DL (ref 12–15.9)
HGB UR QL STRIP.AUTO: ABNORMAL
HYALINE CASTS UR QL AUTO: ABNORMAL /LPF
KETONES UR QL STRIP: NEGATIVE
LEUKOCYTE ESTERASE UR QL STRIP.AUTO: NEGATIVE
LYMPHOCYTES # BLD AUTO: 2.01 10*3/MM3 (ref 0.7–3.1)
LYMPHOCYTES NFR BLD AUTO: 31.6 % (ref 19.6–45.3)
MCH RBC QN AUTO: 32.6 PG (ref 26.6–33)
MCHC RBC AUTO-ENTMCNC: 32.7 G/DL (ref 31.5–35.7)
MCV RBC AUTO: 99.7 FL (ref 79–97)
MONOCYTES # BLD AUTO: 0.74 10*3/MM3 (ref 0.1–0.9)
MONOCYTES NFR BLD AUTO: 11.6 % (ref 5–12)
MUCOUS THREADS URNS QL MICRO: ABNORMAL /HPF
NEUTROPHILS NFR BLD AUTO: 3.58 10*3/MM3 (ref 1.7–7)
NEUTROPHILS NFR BLD AUTO: 56.2 % (ref 42.7–76)
NITRITE UR QL STRIP: NEGATIVE
PH UR STRIP.AUTO: 7 [PH] (ref 5.5–8)
PLATELET # BLD AUTO: 320 10*3/MM3 (ref 140–450)
PMV BLD AUTO: 8.2 FL (ref 6–12)
POTASSIUM SERPL-SCNC: 4.2 MMOL/L (ref 3.4–5)
PROT SERPL-MCNC: 6.8 G/DL (ref 6.3–8.6)
PROT UR QL STRIP: NEGATIVE
RBC # BLD AUTO: 3.68 10*6/MM3 (ref 3.77–5.28)
RBC # UR STRIP: ABNORMAL /HPF
REF LAB TEST METHOD: ABNORMAL
SODIUM SERPL-SCNC: 137 MMOL/L (ref 137–145)
SP GR UR STRIP: 1.02 (ref 1–1.03)
SQUAMOUS #/AREA URNS HPF: ABNORMAL /HPF
UROBILINOGEN UR QL STRIP: ABNORMAL
WBC # UR STRIP: ABNORMAL /HPF
WBC NRBC COR # BLD: 6.37 10*3/MM3 (ref 3.4–10.8)

## 2023-05-01 PROCEDURE — 85025 COMPLETE CBC W/AUTO DIFF WBC: CPT | Performed by: NURSE PRACTITIONER

## 2023-05-01 PROCEDURE — 99214 OFFICE O/P EST MOD 30 MIN: CPT | Performed by: NURSE PRACTITIONER

## 2023-05-01 PROCEDURE — 86803 HEPATITIS C AB TEST: CPT | Performed by: NURSE PRACTITIONER

## 2023-05-01 PROCEDURE — 80053 COMPREHEN METABOLIC PANEL: CPT | Performed by: NURSE PRACTITIONER

## 2023-05-01 PROCEDURE — 81001 URINALYSIS AUTO W/SCOPE: CPT | Performed by: NURSE PRACTITIONER

## 2023-05-01 PROCEDURE — 1160F RVW MEDS BY RX/DR IN RCRD: CPT | Performed by: NURSE PRACTITIONER

## 2023-05-01 PROCEDURE — 36415 COLL VENOUS BLD VENIPUNCTURE: CPT | Performed by: NURSE PRACTITIONER

## 2023-05-01 PROCEDURE — 1159F MED LIST DOCD IN RCRD: CPT | Performed by: NURSE PRACTITIONER

## 2023-05-01 RX ORDER — SULFAMETHOXAZOLE AND TRIMETHOPRIM 800; 160 MG/1; MG/1
1 TABLET ORAL 2 TIMES DAILY
Qty: 20 TABLET | Refills: 0 | Status: SHIPPED | OUTPATIENT
Start: 2023-05-01

## 2023-05-01 NOTE — PROGRESS NOTES
"Chief Complaint  Back Pain    Subjective        Angelica Marlin Call presents to [facility] [place]  History of Present Illness  Patient here today with complaints of lower back pain slight dysuria feeling chilled and having shakes and some slight coarseness over the last several days.  She does have a history of lung cancer, has lost about 15 pounds over the last 5 months but states that this is more of her normal weight anyway.  She says that she is scheduled to have bone density and mammogram and recently did have a negative Cologuard.  Objective   Vital Signs:  /72   Pulse 66   Temp 98.7 °F (37.1 °C)   Ht 167.6 cm (66\")   Wt 69 kg (152 lb 3.2 oz)   SpO2 96%   BMI 24.57 kg/m²   Estimated body mass index is 24.57 kg/m² as calculated from the following:    Height as of this encounter: 167.6 cm (66\").    Weight as of this encounter: 69 kg (152 lb 3.2 oz).     BMI is within normal parameters. No other follow-up for BMI required.    Physical Exam  Vitals and nursing note reviewed.   Constitutional:       General: She is not in acute distress.     Appearance: Normal appearance. She is normal weight. She is not ill-appearing, toxic-appearing or diaphoretic.   HENT:      Head: Normocephalic and atraumatic.   Cardiovascular:      Rate and Rhythm: Normal rate and regular rhythm.      Heart sounds: Normal heart sounds. No murmur heard.    No friction rub. No gallop.   Pulmonary:      Effort: Pulmonary effort is normal. No respiratory distress.      Breath sounds: No stridor. Examination of the right-lower field reveals wheezing. Wheezing present. No rhonchi or rales.      Comments: Pulse ox on RA 96%  Skin:     General: Skin is warm and dry.      Coloration: Skin is not jaundiced or pale.      Findings: No bruising, erythema, lesion or rash.   Neurological:      Mental Status: She is alert and oriented to person, place, and time.   Psychiatric:         Mood and Affect: " Mood normal.         Behavior: Behavior normal.         Thought Content: Thought content normal.         Judgment: Judgment normal.        Result Review :    CMP        5/1/2023    11:51   CMP   Glucose 91     BUN 10     Creatinine 0.59     EGFR 95.9     Sodium 137     Potassium 4.2     Chloride 101     Calcium 9.2     Total Protein 6.8     Albumin 3.6     Globulin 3.2     Total Bilirubin 0.8     Alkaline Phosphatase 140     AST (SGOT) 24     ALT (SGPT) 35     Albumin/Globulin Ratio 1.1     BUN/Creatinine Ratio 16.9     Anion Gap 3.0       CBC        5/1/2023    11:51   CBC   WBC 6.37     RBC 3.68     Hemoglobin 12.0     Hematocrit 36.7     MCV 99.7     MCH 32.6     MCHC 32.7     RDW 12.9     Platelets 320       CBC w/diff        5/1/2023    11:51   CBC w/Diff   WBC 6.37     RBC 3.68     Hemoglobin 12.0     Hematocrit 36.7     MCV 99.7     MCH 32.6     MCHC 32.7     RDW 12.9     Platelets 320     Neutrophil Rel % 56.2     Lymphocyte Rel % 31.6     Monocyte Rel % 11.6     Eosinophil Rel % 0.0     Basophil Rel % 0.6       UA        5/1/2023    11:51   Urinalysis   Squamous Epithelial Cells, UA 3-6     Specific Gravity, UA 1.020     Ketones, UA Negative     Blood, UA Moderate (2+)     Leukocytes, UA Negative     Nitrite, UA Negative     RBC, UA 21-30     WBC, UA 3-5     Bacteria, UA Trace       Data reviewed: Radiologic studies Abdominal x-ray and chest x-ray reviewed from today.         Assessment and Plan   Diagnoses and all orders for this visit:    1. Dysuria (Primary)  -     Urinalysis With Culture If Indicated - Urine, Clean Catch; Future  -     CBC & Differential; Future  -     Comprehensive metabolic panel; Future  -     sulfamethoxazole-trimethoprim (Bactrim DS) 800-160 MG per tablet; Take 1 tablet by mouth 2 (Two) Times a Day.  Dispense: 20 tablet; Refill: 0    2. Need for hepatitis C screening test  -     Hepatitis C Antibody; Future    3. Subacute cough  -     XR Chest 2 View; Future    4. Constipation,  unspecified constipation type  -     XR Abdomen Flat & Upright    5. Chills  -     XR Chest 2 View; Future  -     Urinalysis With Culture If Indicated - Urine, Clean Catch; Future  -     CBC & Differential; Future  -     Comprehensive metabolic panel; Future  -     XR Abdomen Flat & Upright    6. Chronic bronchitis, unspecified chronic bronchitis type    I will obtain abdominal x-ray, chest x-ray and labs as above and inform of results via phone.  I will treat her accordingly.  She will have mammogram and bone density as scheduled.  If symptoms should persist or worsen she will call back and let me know otherwise we will see her back as scheduled for chronic conditions.  All questions and concerns addressed with understanding verbalized.  Patient aware and in agreement to this plan.     I spent 25 minutes caring for Angelica on this date of service. This time includes time spent by me in the following activities:preparing for the visit, reviewing tests, performing a medically appropriate examination and/or evaluation , counseling and educating the patient/family/caregiver, ordering medications, tests, or procedures and documenting information in the medical record  Follow Up   Return if symptoms worsen or fail to improve, for Recheck, or sooner as needed.  Patient was given instructions and counseling regarding her condition or for health maintenance advice. Please see specific information pulled into the AVS if appropriate.

## 2023-05-02 DIAGNOSIS — R31.9 HEMATURIA, UNSPECIFIED TYPE: Primary | ICD-10-CM

## 2023-05-02 LAB — HCV AB SER DONR QL: NORMAL

## 2023-05-10 RX ORDER — LATANOPROST 50 UG/ML
SOLUTION/ DROPS OPHTHALMIC
Qty: 7.5 ML | Refills: 0 | Status: SHIPPED | OUTPATIENT
Start: 2023-05-10

## 2023-08-02 DIAGNOSIS — J40 BRONCHITIS: ICD-10-CM

## 2023-08-02 DIAGNOSIS — R30.0 DYSURIA: ICD-10-CM

## 2023-08-02 RX ORDER — SULFAMETHOXAZOLE AND TRIMETHOPRIM 800; 160 MG/1; MG/1
1 TABLET ORAL 2 TIMES DAILY
Qty: 20 TABLET | Refills: 0 | OUTPATIENT
Start: 2023-08-02

## 2023-08-02 RX ORDER — ATORVASTATIN CALCIUM 80 MG/1
80 TABLET, FILM COATED ORAL NIGHTLY
Qty: 30 TABLET | Refills: 0 | Status: SHIPPED | OUTPATIENT
Start: 2023-08-02

## 2023-08-02 RX ORDER — AZITHROMYCIN 250 MG/1
TABLET, FILM COATED ORAL
Qty: 6 TABLET | Refills: 0 | OUTPATIENT
Start: 2023-08-02

## 2023-08-02 RX ORDER — ATORVASTATIN CALCIUM 80 MG/1
80 TABLET, FILM COATED ORAL NIGHTLY
Qty: 90 TABLET | OUTPATIENT
Start: 2023-08-02

## 2023-08-03 RX ORDER — AZITHROMYCIN 250 MG/1
TABLET, FILM COATED ORAL
Qty: 6 TABLET | Refills: 0 | OUTPATIENT
Start: 2023-08-03

## 2023-08-03 RX ORDER — SULFAMETHOXAZOLE AND TRIMETHOPRIM 800; 160 MG/1; MG/1
1 TABLET ORAL 2 TIMES DAILY
Qty: 20 TABLET | Refills: 0 | OUTPATIENT
Start: 2023-08-03

## 2023-09-05 RX ORDER — ATORVASTATIN CALCIUM 80 MG/1
80 TABLET, FILM COATED ORAL NIGHTLY
Qty: 90 TABLET | OUTPATIENT
Start: 2023-09-05

## 2023-09-05 RX ORDER — ATORVASTATIN CALCIUM 80 MG/1
80 TABLET, FILM COATED ORAL NIGHTLY
Qty: 30 TABLET | Refills: 5 | Status: SHIPPED | OUTPATIENT
Start: 2023-09-05

## 2023-09-06 ENCOUNTER — DOCUMENTATION (OUTPATIENT)
Dept: FAMILY MEDICINE CLINIC | Facility: CLINIC | Age: 73
End: 2023-09-06
Payer: MEDICARE

## 2023-09-06 DIAGNOSIS — J40 BRONCHITIS: Primary | ICD-10-CM

## 2023-09-06 RX ORDER — METHYLPREDNISOLONE 4 MG/1
TABLET ORAL
Qty: 1 EACH | Refills: 0 | Status: SHIPPED | OUTPATIENT
Start: 2023-09-06

## 2023-09-06 RX ORDER — AZITHROMYCIN 250 MG/1
TABLET, FILM COATED ORAL
Qty: 6 TABLET | Refills: 0 | Status: SHIPPED | OUTPATIENT
Start: 2023-09-06

## (undated) DEVICE — PK CATH LAB 60

## (undated) DEVICE — KT INTRO MINISTICK MAX W/GW NITNL/TUNG ECHO 4F 21G 7CM

## (undated) DEVICE — A2000 MULTI-USE SYRINGE KIT, P/N 701277-003KIT CONTENTS: 100ML CONTRAST RESERVOIR AND TUBING WITH CONTRAST SPIKE AND CLAMP: Brand: A2000 MULTI-USE SYRINGE KIT

## (undated) DEVICE — ANGIO-SEAL VIP VASCULAR CLOSURE DEVICE: Brand: ANGIO-SEAL

## (undated) DEVICE — CATH DIAG EXPO M/ PK 6FR FL4/FR4 PIG 3PK

## (undated) DEVICE — MODEL BT2000 P/N 700287-012KIT CONTENTS: MANIFOLD WITH SALINE AND CONTRAST PORTS, SALINE TUBING WITH SPIKE AND HAND SYRINGE, TRANSDUCER: Brand: BT2000 AUTOMATED MANIFOLD KIT

## (undated) DEVICE — INTRO SHEATH ART/FEM ENGAGE .038 6F12CM

## (undated) DEVICE — MODEL AT P65, P/N 701554-001KIT CONTENTS: HAND CONTROLLER, 3-WAY HIGH-PRESSURE STOPCOCK WITH ROTATING END AND PREMIUM HIGH-PRESSURE TUBING: Brand: ANGIOTOUCH® KIT

## (undated) DEVICE — ST CVR PROB PULLUP ULTRASND 5X48IN

## (undated) DEVICE — ELECTRODE,RT,STRESS,FOAM,50PK: Brand: MEDLINE

## (undated) DEVICE — GW PERIPH GUIDERIGHT STD/J/TP PTFE/PCOAT SS 0.038IN 5X150CM